# Patient Record
Sex: MALE | Race: WHITE | NOT HISPANIC OR LATINO | Employment: OTHER | URBAN - NONMETROPOLITAN AREA
[De-identification: names, ages, dates, MRNs, and addresses within clinical notes are randomized per-mention and may not be internally consistent; named-entity substitution may affect disease eponyms.]

---

## 2020-12-01 ENCOUNTER — HOSPITAL ENCOUNTER (EMERGENCY)
Facility: HOSPITAL | Age: 30
Discharge: HOME | End: 2020-12-01
Attending: EMERGENCY MEDICINE | Admitting: EMERGENCY MEDICINE
Payer: COMMERCIAL

## 2020-12-01 VITALS
BODY MASS INDEX: 30.93 KG/M2 | HEART RATE: 90 BPM | HEIGHT: 66 IN | TEMPERATURE: 98.9 F | WEIGHT: 192.46 LBS | SYSTOLIC BLOOD PRESSURE: 130 MMHG | DIASTOLIC BLOOD PRESSURE: 80 MMHG | OXYGEN SATURATION: 99 % | RESPIRATION RATE: 16 BRPM

## 2020-12-01 DIAGNOSIS — L05.01 PILONIDAL CYST WITH ABSCESS: Primary | ICD-10-CM

## 2020-12-01 PROCEDURE — 99283 EMERGENCY DEPT VISIT LOW MDM: CPT

## 2020-12-01 PROCEDURE — 10081 I&D PILONIDAL CYST COMP: CPT | Performed by: PHYSICIAN ASSISTANT

## 2020-12-01 PROCEDURE — 87205 SMEAR GRAM STAIN: CPT | Performed by: PHYSICIAN ASSISTANT

## 2020-12-01 PROCEDURE — 87070 CULTURE OTHR SPECIMN AEROBIC: CPT | Performed by: PHYSICIAN ASSISTANT

## 2020-12-01 PROCEDURE — 99285 EMERGENCY DEPT VISIT HI MDM: CPT | Performed by: PHYSICIAN ASSISTANT

## 2020-12-01 RX ORDER — DOXYCYCLINE HYCLATE 100 MG/1
100 CAPSULE ORAL ONCE
Status: COMPLETED | OUTPATIENT
Start: 2020-12-01 | End: 2020-12-01

## 2020-12-01 RX ORDER — HYDROCODONE BITARTRATE AND ACETAMINOPHEN 5; 325 MG/1; MG/1
1 TABLET ORAL ONCE
Status: COMPLETED | OUTPATIENT
Start: 2020-12-01 | End: 2020-12-01

## 2020-12-01 RX ORDER — HYDROCODONE BITARTRATE AND ACETAMINOPHEN 5; 325 MG/1; MG/1
1 TABLET ORAL EVERY 6 HOURS PRN
Qty: 4 TABLET | Refills: 0 | Status: SHIPPED | OUTPATIENT
Start: 2020-12-01 | End: 2020-12-16 | Stop reason: HOSPADM

## 2020-12-01 RX ORDER — LIDOCAINE HYDROCHLORIDE AND EPINEPHRINE 10; 10 MG/ML; UG/ML
5 INJECTION, SOLUTION INFILTRATION; PERINEURAL ONCE
Status: COMPLETED | OUTPATIENT
Start: 2020-12-01 | End: 2020-12-01

## 2020-12-01 RX ORDER — DOXYCYCLINE HYCLATE 100 MG/1
100 CAPSULE ORAL 2 TIMES DAILY
Qty: 14 CAPSULE | Refills: 0 | Status: SHIPPED | OUTPATIENT
Start: 2020-12-01 | End: 2020-12-08

## 2020-12-01 RX ADMIN — LIDOCAINE HYDROCHLORIDE,EPINEPHRINE BITARTRATE 5 ML: 10; .01 INJECTION, SOLUTION INFILTRATION; PERINEURAL at 13:56

## 2020-12-01 RX ADMIN — DOXYCYCLINE HYCLATE 100 MG: 100 CAPSULE ORAL at 13:55

## 2020-12-01 RX ADMIN — HYDROCODONE BITARTRATE AND ACETAMINOPHEN 1 TABLET: 5; 325 TABLET ORAL at 13:55

## 2020-12-03 LAB
BACTERIA WND AEROBE CULT: NORMAL
GRAM STN SPEC: NORMAL

## 2020-12-09 ENCOUNTER — CONSULT (OUTPATIENT)
Dept: SURGERY | Facility: CLINIC | Age: 30
End: 2020-12-09
Payer: COMMERCIAL

## 2020-12-09 VITALS
TEMPERATURE: 98.6 F | BODY MASS INDEX: 32.14 KG/M2 | WEIGHT: 200 LBS | HEART RATE: 94 BPM | HEIGHT: 66 IN | DIASTOLIC BLOOD PRESSURE: 84 MMHG | SYSTOLIC BLOOD PRESSURE: 131 MMHG

## 2020-12-09 DIAGNOSIS — L05.01 PILONIDAL CYST WITH ABSCESS: Primary | ICD-10-CM

## 2020-12-09 PROCEDURE — 99204 OFFICE O/P NEW MOD 45 MIN: CPT | Performed by: SURGERY

## 2020-12-09 RX ORDER — CHLORHEXIDINE GLUCONATE 4 G/100ML
SOLUTION TOPICAL DAILY PRN
Status: CANCELLED | OUTPATIENT
Start: 2020-12-09

## 2020-12-09 RX ORDER — SODIUM CHLORIDE, SODIUM LACTATE, POTASSIUM CHLORIDE, CALCIUM CHLORIDE 600; 310; 30; 20 MG/100ML; MG/100ML; MG/100ML; MG/100ML
125 INJECTION, SOLUTION INTRAVENOUS CONTINUOUS
Status: CANCELLED | OUTPATIENT
Start: 2020-12-16

## 2020-12-09 RX ORDER — HEPARIN SODIUM 5000 [USP'U]/ML
5000 INJECTION, SOLUTION INTRAVENOUS; SUBCUTANEOUS ONCE
Status: CANCELLED | OUTPATIENT
Start: 2020-12-16 | End: 2020-12-09

## 2020-12-09 RX ORDER — CEFAZOLIN SODIUM 2 G/50ML
2000 SOLUTION INTRAVENOUS ONCE
Status: CANCELLED | OUTPATIENT
Start: 2020-12-16 | End: 2020-12-09

## 2020-12-15 ENCOUNTER — ANESTHESIA EVENT (OUTPATIENT)
Dept: PERIOP | Facility: HOSPITAL | Age: 30
End: 2020-12-15
Payer: COMMERCIAL

## 2020-12-15 RX ORDER — OMEPRAZOLE 20 MG/1
20 CAPSULE, DELAYED RELEASE ORAL DAILY
Status: ON HOLD | COMMUNITY
End: 2021-06-02

## 2020-12-16 ENCOUNTER — ANESTHESIA (OUTPATIENT)
Dept: PERIOP | Facility: HOSPITAL | Age: 30
End: 2020-12-16
Payer: COMMERCIAL

## 2020-12-16 ENCOUNTER — HOSPITAL ENCOUNTER (OUTPATIENT)
Facility: HOSPITAL | Age: 30
Setting detail: OUTPATIENT SURGERY
Discharge: HOME | End: 2020-12-16
Attending: SURGERY | Admitting: SURGERY
Payer: COMMERCIAL

## 2020-12-16 VITALS
HEIGHT: 66 IN | TEMPERATURE: 98.5 F | HEART RATE: 78 BPM | OXYGEN SATURATION: 95 % | DIASTOLIC BLOOD PRESSURE: 70 MMHG | BODY MASS INDEX: 32.14 KG/M2 | WEIGHT: 200 LBS | RESPIRATION RATE: 18 BRPM | SYSTOLIC BLOOD PRESSURE: 118 MMHG

## 2020-12-16 VITALS — HEART RATE: 85 BPM

## 2020-12-16 DIAGNOSIS — L05.01 PILONIDAL CYST WITH ABSCESS: ICD-10-CM

## 2020-12-16 PROBLEM — K21.9 GASTROESOPHAGEAL REFLUX DISEASE: Status: ACTIVE | Noted: 2020-12-16

## 2020-12-16 PROBLEM — M54.9 BACK PAIN: Status: ACTIVE | Noted: 2020-12-16

## 2020-12-16 PROBLEM — J45.909 MILD ASTHMA: Status: ACTIVE | Noted: 2020-12-16

## 2020-12-16 PROBLEM — F17.200 SMOKING: Status: ACTIVE | Noted: 2020-12-16

## 2020-12-16 PROCEDURE — 11772 EXC PILONIDAL CYST COMP: CPT | Performed by: SURGERY

## 2020-12-16 PROCEDURE — 88304 TISSUE EXAM BY PATHOLOGIST: CPT | Performed by: PATHOLOGY

## 2020-12-16 RX ORDER — FENTANYL CITRATE 50 UG/ML
INJECTION, SOLUTION INTRAMUSCULAR; INTRAVENOUS AS NEEDED
Status: DISCONTINUED | OUTPATIENT
Start: 2020-12-16 | End: 2020-12-16

## 2020-12-16 RX ORDER — ONDANSETRON 2 MG/ML
INJECTION INTRAMUSCULAR; INTRAVENOUS AS NEEDED
Status: DISCONTINUED | OUTPATIENT
Start: 2020-12-16 | End: 2020-12-16

## 2020-12-16 RX ORDER — KETOROLAC TROMETHAMINE 30 MG/ML
INJECTION, SOLUTION INTRAMUSCULAR; INTRAVENOUS AS NEEDED
Status: DISCONTINUED | OUTPATIENT
Start: 2020-12-16 | End: 2020-12-16

## 2020-12-16 RX ORDER — FENTANYL CITRATE/PF 50 MCG/ML
50 SYRINGE (ML) INJECTION
Status: COMPLETED | OUTPATIENT
Start: 2020-12-16 | End: 2020-12-16

## 2020-12-16 RX ORDER — HYDROCODONE BITARTRATE AND ACETAMINOPHEN 5; 325 MG/1; MG/1
2 TABLET ORAL EVERY 6 HOURS PRN
Status: DISCONTINUED | OUTPATIENT
Start: 2020-12-16 | End: 2020-12-16 | Stop reason: HOSPADM

## 2020-12-16 RX ORDER — SODIUM CHLORIDE, SODIUM LACTATE, POTASSIUM CHLORIDE, CALCIUM CHLORIDE 600; 310; 30; 20 MG/100ML; MG/100ML; MG/100ML; MG/100ML
125 INJECTION, SOLUTION INTRAVENOUS CONTINUOUS
Status: DISCONTINUED | OUTPATIENT
Start: 2020-12-16 | End: 2020-12-16

## 2020-12-16 RX ORDER — HEPARIN SODIUM 5000 [USP'U]/ML
5000 INJECTION, SOLUTION INTRAVENOUS; SUBCUTANEOUS ONCE
Status: COMPLETED | OUTPATIENT
Start: 2020-12-16 | End: 2020-12-16

## 2020-12-16 RX ORDER — MEPERIDINE HYDROCHLORIDE 25 MG/ML
25 INJECTION INTRAMUSCULAR; INTRAVENOUS; SUBCUTANEOUS ONCE AS NEEDED
Status: DISCONTINUED | OUTPATIENT
Start: 2020-12-16 | End: 2020-12-16 | Stop reason: HOSPADM

## 2020-12-16 RX ORDER — MAGNESIUM HYDROXIDE 1200 MG/15ML
LIQUID ORAL AS NEEDED
Status: DISCONTINUED | OUTPATIENT
Start: 2020-12-16 | End: 2020-12-16 | Stop reason: HOSPADM

## 2020-12-16 RX ORDER — BUPIVACAINE HYDROCHLORIDE 5 MG/ML
INJECTION, SOLUTION PERINEURAL AS NEEDED
Status: DISCONTINUED | OUTPATIENT
Start: 2020-12-16 | End: 2020-12-16 | Stop reason: HOSPADM

## 2020-12-16 RX ORDER — HYDROMORPHONE HCL/PF 1 MG/ML
0.5 SYRINGE (ML) INJECTION
Status: DISCONTINUED | OUTPATIENT
Start: 2020-12-16 | End: 2020-12-16 | Stop reason: HOSPADM

## 2020-12-16 RX ORDER — CEFAZOLIN SODIUM 2 G/50ML
2000 SOLUTION INTRAVENOUS ONCE
Status: COMPLETED | OUTPATIENT
Start: 2020-12-16 | End: 2020-12-16

## 2020-12-16 RX ORDER — PROPOFOL 10 MG/ML
INJECTION, EMULSION INTRAVENOUS AS NEEDED
Status: DISCONTINUED | OUTPATIENT
Start: 2020-12-16 | End: 2020-12-16

## 2020-12-16 RX ORDER — SODIUM CHLORIDE, SODIUM LACTATE, POTASSIUM CHLORIDE, CALCIUM CHLORIDE 600; 310; 30; 20 MG/100ML; MG/100ML; MG/100ML; MG/100ML
125 INJECTION, SOLUTION INTRAVENOUS CONTINUOUS
Status: DISCONTINUED | OUTPATIENT
Start: 2020-12-16 | End: 2020-12-16 | Stop reason: HOSPADM

## 2020-12-16 RX ORDER — MIDAZOLAM HYDROCHLORIDE 2 MG/2ML
INJECTION, SOLUTION INTRAMUSCULAR; INTRAVENOUS AS NEEDED
Status: DISCONTINUED | OUTPATIENT
Start: 2020-12-16 | End: 2020-12-16

## 2020-12-16 RX ORDER — MORPHINE SULFATE 4 MG/ML
2 INJECTION, SOLUTION INTRAMUSCULAR; INTRAVENOUS EVERY 4 HOURS PRN
Status: DISCONTINUED | OUTPATIENT
Start: 2020-12-16 | End: 2020-12-16 | Stop reason: HOSPADM

## 2020-12-16 RX ORDER — LIDOCAINE HYDROCHLORIDE 10 MG/ML
0.5 INJECTION, SOLUTION EPIDURAL; INFILTRATION; INTRACAUDAL; PERINEURAL ONCE AS NEEDED
Status: DISCONTINUED | OUTPATIENT
Start: 2020-12-16 | End: 2020-12-16 | Stop reason: HOSPADM

## 2020-12-16 RX ORDER — HYDROCODONE BITARTRATE AND ACETAMINOPHEN 5; 325 MG/1; MG/1
1 TABLET ORAL EVERY 4 HOURS PRN
Qty: 20 TABLET | Refills: 0 | Status: SHIPPED | OUTPATIENT
Start: 2020-12-16 | End: 2020-12-21 | Stop reason: SDUPTHER

## 2020-12-16 RX ORDER — ROCURONIUM BROMIDE 10 MG/ML
INJECTION, SOLUTION INTRAVENOUS AS NEEDED
Status: DISCONTINUED | OUTPATIENT
Start: 2020-12-16 | End: 2020-12-16

## 2020-12-16 RX ORDER — ONDANSETRON 2 MG/ML
4 INJECTION INTRAMUSCULAR; INTRAVENOUS EVERY 8 HOURS PRN
Status: DISCONTINUED | OUTPATIENT
Start: 2020-12-16 | End: 2020-12-16 | Stop reason: HOSPADM

## 2020-12-16 RX ORDER — ALBUTEROL SULFATE 2.5 MG/3ML
2.5 SOLUTION RESPIRATORY (INHALATION) ONCE
Status: COMPLETED | OUTPATIENT
Start: 2020-12-16 | End: 2020-12-16

## 2020-12-16 RX ORDER — CHLORHEXIDINE GLUCONATE 4 G/100ML
SOLUTION TOPICAL DAILY PRN
Status: DISCONTINUED | OUTPATIENT
Start: 2020-12-16 | End: 2020-12-16 | Stop reason: HOSPADM

## 2020-12-16 RX ADMIN — FENTANYL CITRATE 50 MCG: 50 INJECTION, SOLUTION INTRAMUSCULAR; INTRAVENOUS at 09:05

## 2020-12-16 RX ADMIN — FENTANYL CITRATE 50 MCG: 0.05 INJECTION, SOLUTION INTRAMUSCULAR; INTRAVENOUS at 10:19

## 2020-12-16 RX ADMIN — HYDROCODONE BITARTRATE AND ACETAMINOPHEN 2 TABLET: 5; 325 TABLET ORAL at 11:03

## 2020-12-16 RX ADMIN — CEFAZOLIN SODIUM 2000 MG: 2 SOLUTION INTRAVENOUS at 09:06

## 2020-12-16 RX ADMIN — ONDANSETRON HYDROCHLORIDE 4 MG: 2 INJECTION, SOLUTION INTRAVENOUS at 09:24

## 2020-12-16 RX ADMIN — HEPARIN SODIUM 5000 UNITS: 5000 INJECTION INTRAVENOUS; SUBCUTANEOUS at 08:26

## 2020-12-16 RX ADMIN — HYDROMORPHONE HYDROCHLORIDE 0.5 MG: 1 INJECTION, SOLUTION INTRAMUSCULAR; INTRAVENOUS; SUBCUTANEOUS at 10:31

## 2020-12-16 RX ADMIN — SUGAMMADEX 181 MG: 100 INJECTION, SOLUTION INTRAVENOUS at 09:47

## 2020-12-16 RX ADMIN — FENTANYL CITRATE 50 MCG: 50 INJECTION, SOLUTION INTRAMUSCULAR; INTRAVENOUS at 09:56

## 2020-12-16 RX ADMIN — FENTANYL CITRATE 100 MCG: 50 INJECTION, SOLUTION INTRAMUSCULAR; INTRAVENOUS at 09:16

## 2020-12-16 RX ADMIN — SODIUM CHLORIDE, SODIUM LACTATE, POTASSIUM CHLORIDE, AND CALCIUM CHLORIDE 125 ML/HR: .6; .31; .03; .02 INJECTION, SOLUTION INTRAVENOUS at 08:25

## 2020-12-16 RX ADMIN — KETOROLAC TROMETHAMINE 30 MG: 30 INJECTION, SOLUTION INTRAMUSCULAR; INTRAVENOUS at 09:45

## 2020-12-16 RX ADMIN — ROCURONIUM BROMIDE 35 MG: 50 INJECTION, SOLUTION INTRAVENOUS at 09:06

## 2020-12-16 RX ADMIN — FENTANYL CITRATE 50 MCG: 0.05 INJECTION, SOLUTION INTRAMUSCULAR; INTRAVENOUS at 10:16

## 2020-12-16 RX ADMIN — MIDAZOLAM HYDROCHLORIDE 2 MG: 1 INJECTION, SOLUTION INTRAMUSCULAR; INTRAVENOUS at 09:02

## 2020-12-16 RX ADMIN — ALBUTEROL SULFATE 2.5 MG: 2.5 SOLUTION RESPIRATORY (INHALATION) at 10:07

## 2020-12-16 RX ADMIN — PROPOFOL 200 MG: 10 INJECTION, EMULSION INTRAVENOUS at 09:05

## 2020-12-21 ENCOUNTER — TELEPHONE (OUTPATIENT)
Dept: SURGERY | Facility: CLINIC | Age: 30
End: 2020-12-21

## 2020-12-21 DIAGNOSIS — L05.01 PILONIDAL CYST WITH ABSCESS: ICD-10-CM

## 2020-12-21 RX ORDER — HYDROCODONE BITARTRATE AND ACETAMINOPHEN 5; 325 MG/1; MG/1
1 TABLET ORAL EVERY 4 HOURS PRN
Qty: 20 TABLET | Refills: 0 | Status: SHIPPED | OUTPATIENT
Start: 2020-12-21 | End: 2021-03-31

## 2020-12-23 ENCOUNTER — OFFICE VISIT (OUTPATIENT)
Dept: SURGERY | Facility: CLINIC | Age: 30
End: 2020-12-23

## 2020-12-23 VITALS
HEIGHT: 66 IN | SYSTOLIC BLOOD PRESSURE: 150 MMHG | DIASTOLIC BLOOD PRESSURE: 100 MMHG | BODY MASS INDEX: 32.3 KG/M2 | WEIGHT: 201 LBS | TEMPERATURE: 98.2 F | HEART RATE: 98 BPM

## 2020-12-23 DIAGNOSIS — L05.01 PILONIDAL CYST WITH ABSCESS: Primary | ICD-10-CM

## 2020-12-23 PROCEDURE — 99024 POSTOP FOLLOW-UP VISIT: CPT | Performed by: SURGERY

## 2020-12-23 RX ORDER — IBUPROFEN 800 MG/1
800 TABLET ORAL EVERY 6 HOURS PRN
Qty: 30 TABLET | Refills: 2 | Status: SHIPPED | OUTPATIENT
Start: 2020-12-23 | End: 2021-05-02

## 2020-12-23 RX ORDER — GABAPENTIN 100 MG/1
100 CAPSULE ORAL 3 TIMES DAILY
Qty: 42 CAPSULE | Refills: 1 | Status: SHIPPED | OUTPATIENT
Start: 2020-12-23 | End: 2021-03-31

## 2020-12-31 ENCOUNTER — OFFICE VISIT (OUTPATIENT)
Dept: SURGERY | Facility: CLINIC | Age: 30
End: 2020-12-31

## 2020-12-31 VITALS
DIASTOLIC BLOOD PRESSURE: 74 MMHG | HEART RATE: 78 BPM | SYSTOLIC BLOOD PRESSURE: 128 MMHG | TEMPERATURE: 98.6 F | HEIGHT: 66 IN | WEIGHT: 204 LBS | BODY MASS INDEX: 32.78 KG/M2

## 2020-12-31 DIAGNOSIS — L05.01 PILONIDAL CYST WITH ABSCESS: Primary | ICD-10-CM

## 2020-12-31 PROCEDURE — 99024 POSTOP FOLLOW-UP VISIT: CPT | Performed by: SURGERY

## 2021-03-03 ENCOUNTER — APPOINTMENT (EMERGENCY)
Dept: RADIOLOGY | Facility: HOSPITAL | Age: 31
End: 2021-03-03
Payer: COMMERCIAL

## 2021-03-03 ENCOUNTER — APPOINTMENT (EMERGENCY)
Dept: CT IMAGING | Facility: HOSPITAL | Age: 31
End: 2021-03-03
Payer: COMMERCIAL

## 2021-03-03 ENCOUNTER — HOSPITAL ENCOUNTER (EMERGENCY)
Facility: HOSPITAL | Age: 31
Discharge: HOME | End: 2021-03-03
Attending: EMERGENCY MEDICINE | Admitting: EMERGENCY MEDICINE
Payer: COMMERCIAL

## 2021-03-03 VITALS
HEART RATE: 83 BPM | RESPIRATION RATE: 16 BRPM | BODY MASS INDEX: 30.12 KG/M2 | WEIGHT: 187.39 LBS | DIASTOLIC BLOOD PRESSURE: 64 MMHG | TEMPERATURE: 97.2 F | OXYGEN SATURATION: 99 % | HEIGHT: 66 IN | SYSTOLIC BLOOD PRESSURE: 130 MMHG

## 2021-03-03 DIAGNOSIS — M54.12 ACUTE CERVICAL RADICULOPATHY: Primary | ICD-10-CM

## 2021-03-03 PROCEDURE — G1004 CDSM NDSC: HCPCS

## 2021-03-03 PROCEDURE — 73030 X-RAY EXAM OF SHOULDER: CPT

## 2021-03-03 PROCEDURE — 72125 CT NECK SPINE W/O DYE: CPT

## 2021-03-03 PROCEDURE — 99284 EMERGENCY DEPT VISIT MOD MDM: CPT

## 2021-03-03 PROCEDURE — 99285 EMERGENCY DEPT VISIT HI MDM: CPT | Performed by: EMERGENCY MEDICINE

## 2021-03-03 RX ORDER — MORPHINE SULFATE 15 MG/1
7.5 TABLET ORAL EVERY 6 HOURS PRN
Qty: 7 TABLET | Refills: 0 | Status: SHIPPED | OUTPATIENT
Start: 2021-03-03 | End: 2021-03-31

## 2021-03-03 RX ORDER — HYDROCODONE BITARTRATE AND ACETAMINOPHEN 5; 325 MG/1; MG/1
1 TABLET ORAL ONCE
Status: COMPLETED | OUTPATIENT
Start: 2021-03-03 | End: 2021-03-03

## 2021-03-03 RX ADMIN — HYDROCODONE BITARTRATE AND ACETAMINOPHEN 1 TABLET: 5; 325 TABLET ORAL at 14:25

## 2021-03-03 NOTE — ED PROVIDER NOTES
History  Chief Complaint   Patient presents with    Arm Pain     pt complains of arm pain for 3 weeks  This is a 41-year-old right-handed man who presents to the emergency department stating that he is having pain about the left shoulder and left arm present for approximately the past three weeks  Pain is present at rest but worse with active range of motion in most planes of motion  Patient states that he woke up with his neck flexed and rotated to the left side approximately three weeks ago; he required the assistance of his significant other to move his neck back into its normal position  Since that time, he has had pain in the left shoulder with paresthesias radiating from the left side of his neck into the left upper extremity  He states that the left third digit is affected primarily with the paresthesias  More recently, he has noted some sensation of weakness in the left upper extremity  He notes that the symptoms may be worsening over time related to difficulties with positioning  He underwent pilonidal cyst excision in December 2020  This has limited his ability to lie supine when sleeping; he has therefore spent most of this time lying on either his right or left side (primary his right due to the pain in his left shoulder) when sleeping for the past 2 months  He has applied a topical analgesic medication and taken multiple doses of ibuprofen without any significant improvement  He has had prior left shoulder anterior dislocation  No surgery in the left upper extremity  A/p:  Left upper extremity paresthesias/weakness following what may be torticollis by description  Will obtain CT cervical spine and left shoulder x-ray  History provided by:  Patient and medical records  Arm Pain  Associated symptoms: myalgias    Associated symptoms: no chest pain and no rash        Prior to Admission Medications   Prescriptions Last Dose Informant Patient Reported? Taking? HYDROcodone-acetaminophen (NORCO) 5-325 mg per tablet   No No   Sig: Take 1 tablet by mouth every 4 (four) hours as needed for painMax Daily Amount: 6 tablets   gabapentin (NEURONTIN) 100 mg capsule   No No   Sig: Take 1 capsule (100 mg total) by mouth 3 (three) times a day   ibuprofen (MOTRIN) 800 mg tablet   No No   Sig: Take 1 tablet (800 mg total) by mouth every 6 (six) hours as needed for mild pain   omeprazole (PriLOSEC) 20 mg delayed release capsule   Yes No   Sig: Take 20 mg by mouth daily      Facility-Administered Medications: None       Past Medical History:   Diagnosis Date    Anesthesia     Pt reports " freaking out" when he was waking up- states he was pulling out IV   Anxiety     Asthma     Body piercing     Pt reports having an eyebrow piercing that cannot be removed   Broken teeth     Bronchitis     Constipation     Cough     Pt reports coughing "a lot"  He states he is a heavy smoker but also reports asthma  Pt does not have an inhaler  States he cannot get in to see a PCP because many are not taking new patients during Covid   Depression     GERD (gastroesophageal reflux disease)     Heart murmur     History of stomach ulcers     Insomnia     Pt state he "tosses and turns" all night long d/t pain   Kidney stone     Pt reports that stones passed on their own   Lumbar back pain with radiculopathy affecting left lower extremity     Pt reports falling off a roof and " breaking my back"    Pilonidal cyst     Pt reports recurring cyst that drains at times    Pneumonia     Shortness of breath     Teeth missing        Past Surgical History:   Procedure Laterality Date    MANDIBLE SURGERY      [place] REMV PILONIDAL LESION EXTENS N/A 12/16/2020    Procedure: EXCISION PILONIDAL CYST;  Surgeon: Faina Mckinney DO;  Location: [facility];  Service: General       History reviewed  No pertinent family history    I have reviewed and agree with the history as documented  E-Cigarette/Vaping    E-Cigarette Use Never User      E-Cigarette/Vaping Substances    Nicotine No     THC No     CBD No     Flavoring No     Other No     Unknown No      Social History     Tobacco Use    Smoking status: Current Every Day Smoker     Packs/day: 1 00     Years: 10 00     Pack years: 10 00     Types: Cigarettes    Smokeless tobacco: Former User    Tobacco comment: Pt quit smokeless tobacco ten years ago   Substance Use Topics    Alcohol use: Not Currently    Drug use: Yes     Frequency: 7 0 times per week     Types: Marijuana     Comment: Pt smokes about a 1/4 pound a week  Wants to get a Medical  marijuana card       Review of Systems   Constitutional: Negative  Cardiovascular: Negative for chest pain and palpitations  Musculoskeletal: Positive for arthralgias and myalgias  Skin: Negative for color change and rash  Neurological: Negative for weakness and numbness  All other systems reviewed and are negative  Physical Exam  Physical Exam  Vitals signs and nursing note reviewed  Constitutional:       General: He is awake  He is not in acute distress  Appearance: He is well-developed  HENT:      Head: Normocephalic and atraumatic  Right Ear: Hearing and external ear normal       Left Ear: Hearing and external ear normal    Neck:      Musculoskeletal: Muscular tenderness present  No spinous process tenderness  Thyroid: No thyroid mass or thyromegaly  Trachea: Trachea and phonation normal         Comments: There is no posterior midline tenderness to palpation or step-off  There is left-sided muscular hypertonicity particularly in the superior medial aspect of the left trapezius at approximately C6-7 level  Patient states that palpation of this area reproduces paresthesias in the LUE to the level of the elbow  Cardiovascular:      Rate and Rhythm: Normal rate and regular rhythm        Pulses:           Radial pulses are 2+ on the right side and 2+ on the left side  Heart sounds: Normal heart sounds, S1 normal and S2 normal  No murmur  No friction rub  No gallop  Pulmonary:      Effort: Pulmonary effort is normal  No respiratory distress  Breath sounds: Normal breath sounds  No stridor  No decreased breath sounds, wheezing, rhonchi or rales  Musculoskeletal:         General: No tenderness or deformity  Comments: There is no posterior midline C/T/L-spine tenderness to palpation or step-off  Left upper extremity:   There is moderate tenderness to palpation of mild hypertonicity of the glenoid and the medial scapula  Passive range of motion is intact; it is painful with extreme abduction and flexion but otherwise nonpainful  There is a positive impingement sign  Empty can test is negative  Skin:     General: Skin is warm and dry  Neurological:      Mental Status: He is alert and oriented to person, place, and time  GCS: GCS eye subscore is 4  GCS verbal subscore is 5  GCS motor subscore is 6  Motor: No abnormal muscle tone  Deep Tendon Reflexes:      Reflex Scores:       Brachioradialis reflexes are 2+ on the right side and 2+ on the left side  Patellar reflexes are 2+ on the right side and 2+ on the left side  Achilles reflexes are 2+ on the right side and 2+ on the left side  Comments: Strength 5/5 in UE bilaterally at shoulder/elbow/wrist in all planes of motion    There is mild hyperesthesia to sharp/dull sensation in the C6-8 distribution in the LUE   Otherwise intact sensation to sharp/dull in the b/l UE         Vital Signs  ED Triage Vitals [03/03/21 1403]   Temperature Pulse Respirations Blood Pressure SpO2   (!) 97 2 °F (36 2 °C) 83 16 130/64 99 %      Temp Source Heart Rate Source Patient Position - Orthostatic VS BP Location FiO2 (%)   Temporal -- Lying Right arm --      Pain Score       8           Vitals:    03/03/21 1403   BP: 130/64   Pulse: 83   Patient Position - Orthostatic VS: Lying         Visual Acuity      ED Medications  Medications   HYDROcodone-acetaminophen (NORCO) 5-325 mg per tablet 1 tablet (1 tablet Oral Given 3/3/21 1425)       Diagnostic Studies  Results Reviewed     None                 XR shoulder 2+ views LEFT   ED Interpretation by Leela Plata DO (03/03 1505)   No evidence of fracture/dislocation  Joint spaces appear normal       Final Result by Kelly George MD (03/03 1541)      No acute osseous abnormality  Workstation performed: [id number]         CT spine cervical without contrast   Final Result by Yang Servin DO (03/03 1526)      No acute findings  Mild degenerative changes as above  No discernible compressive neuropathy  Consider nonemergent follow-up cervical MRI for any persistent or worsening symptoms  Incidental findings as above  Workstation performed: [id number]                    Procedures  Procedures         ED Course  ED Course as of Mar 03 1618   Wed Mar 03, 2021   1447 CT completed and awaiting interpretation      1527 IMPRESSION:     No acute findings      Mild degenerative changes as above  No discernible compressive neuropathy  Consider nonemergent follow-up cervical MRI for any persistent or worsening symptoms      Incidental findings as above  CT spine cervical without contrast       MDM  Number of Diagnoses or Management Options  Acute cervical radiculopathy: new and requires workup  Diagnosis management comments: All results were noted and discussed with the patient  Will give follow-up to comprehensive [address] for further evaluation  Patient states he will stop using ibuprofen as he is developing some degree of stomach upset and nausea; he does not want to continue with his medication  Both because of this and given the duration of his symptoms, I do not think that steroids would be particularly helpful for him  Activity as tolerated with his left upper extremity    All questions were answered prior to discharge  The patient expressed understanding and agreed to plan  Amount and/or Complexity of Data Reviewed  Tests in the radiology section of CPT®: ordered and reviewed  Decide to obtain previous medical records or to obtain history from someone other than the patient: yes  Review and summarize past medical records: yes  Independent visualization of images, tracings, or specimens: yes    Risk of Complications, Morbidity, and/or Mortality  Presenting problems: high  Diagnostic procedures: high  Management options: moderate  General comments: [place]MP queried prior to Rx  Based on review of records, there is no evidence of controlled substance abuse or repeated inappropriate ED visits to obtain controlled substances  It is therefore reasonable to prescribe a short course of opiate-based analgesic for symptom control with close f/u to PMD also advised  Precautions given to avoid use of opiate medications while driving and to abstain from alcohol while using  Patient Progress  Patient progress: stable      Disposition  Final diagnoses:   Acute cervical radiculopathy     Time reflects when diagnosis was documented in both MDM as applicable and the Disposition within this note     Time User Action Codes Description Comment    3/3/2021  3:41 PM Kateryna Solis Add [G24 76] Acute cervical radiculopathy       ED Disposition     ED Disposition Condition Date/Time Comment    Discharge Stable Wed Mar 3, 2021  3:40 PM Olga Chavez discharge to home/self care              Follow-up Information     Follow up With Specialties Details Why Contact Info Additional Information    [facility]  Call in 1 day For follow-up of your neck symptoms [address] [facility] [place] [address] [facility] [address], [address] [phone]     [facility] Emergency Department Emergency Medicine Go to  If symptoms worsen: if you develop any new numbness or weakness in any other limbs, if you are completely unable to move your neck, or if you develop a fever with your neck/shoulder pain [phone]  [facility] Emergency Department, [address], [address]          Discharge Medication List as of 3/3/2021  3:46 PM      START taking these medications    Details   morphine (MSIR) 15 mg tablet Take 0 5 tablets (7 5 mg total) by mouth every 6 (six) hours as needed for severe pain (Do not drive or drink alcohol while using)Max Daily Amount: 30 mg, Starting Wed 3/3/2021, Normal         CONTINUE these medications which have NOT CHANGED    Details   gabapentin (NEURONTIN) 100 mg capsule Take 1 capsule (100 mg total) by mouth 3 (three) times a day, Starting Wed 12/23/2020, Normal      HYDROcodone-acetaminophen (NORCO) 5-325 mg per tablet Take 1 tablet by mouth every 4 (four) hours as needed for painMax Daily Amount: 6 tablets, Starting Mon 12/21/2020, Normal      ibuprofen (MOTRIN) 800 mg tablet Take 1 tablet (800 mg total) by mouth every 6 (six) hours as needed for mild pain, Starting Wed 12/23/2020, Normal      omeprazole (PriLOSEC) 20 mg delayed release capsule Take 20 mg by mouth daily, Historical Med           No discharge procedures on file      PDMP Review       Value Time User    PDMP Reviewed  Yes 12/1/2020  1:45 PM Jass Baker PA-C          ED Provider  Electronically Signed by           Allison Bernal DO  03/03/21 3597

## 2021-03-25 ENCOUNTER — TELEPHONE (OUTPATIENT)
Dept: PAIN MEDICINE | Facility: CLINIC | Age: 31
End: 2021-03-25

## 2021-03-25 NOTE — TELEPHONE ENCOUNTER
Patient called cancelling today's consultation due to him not having a baby sittter  He's rescheduled for next week

## 2021-03-31 ENCOUNTER — CONSULT (OUTPATIENT)
Dept: PAIN MEDICINE | Facility: CLINIC | Age: 31
End: 2021-03-31
Payer: COMMERCIAL

## 2021-03-31 VITALS
HEIGHT: 66 IN | BODY MASS INDEX: 29.63 KG/M2 | DIASTOLIC BLOOD PRESSURE: 78 MMHG | SYSTOLIC BLOOD PRESSURE: 117 MMHG | WEIGHT: 184.4 LBS | HEART RATE: 76 BPM

## 2021-03-31 DIAGNOSIS — M54.12 CERVICAL RADICULOPATHY: ICD-10-CM

## 2021-03-31 DIAGNOSIS — M54.2 NECK PAIN: Primary | ICD-10-CM

## 2021-03-31 PROCEDURE — 99204 OFFICE O/P NEW MOD 45 MIN: CPT | Performed by: ANESTHESIOLOGY

## 2021-03-31 RX ORDER — AMITRIPTYLINE HYDROCHLORIDE 25 MG/1
25 TABLET, FILM COATED ORAL
Qty: 30 TABLET | Refills: 1 | Status: SHIPPED | OUTPATIENT
Start: 2021-03-31 | End: 2021-04-23

## 2021-03-31 NOTE — PATIENT INSTRUCTIONS
Neck Exercises   WHAT YOU NEED TO KNOW:   Why is it important to do neck exercises? Neck exercises help reduce neck pain, and improve neck movement and strength  Neck exercises also help prevent long-term neck problems  What do I need to know about neck exercises? · Do the exercises every day,  or as often as directed by your healthcare provider  · Move slowly, gently, and smoothly  Avoid fast or jerky motions  · Stand and sit the way your healthcare provider shows you  Good posture may reduce your neck pain  Check your posture often, even when you are not doing your neck exercises  How do I perform neck exercises safely? · Exercise position:  You may sit or stand while you do neck exercises  Face forward  Your shoulders should be straight and relaxed, with a good posture  · Head tilts, forward and back:  Gently bow your head and try to touch your chin to your chest  Your healthcare provider may tell you to push on the back of your neck to help bow your head  Raise your chin back to the starting position  Tilt your head back as far as possible so you are looking up at the ceiling  Your healthcare provider may tell you to lift your chin to help tilt your head back  Return your head to the starting position  · Head tilts, side to side:  Tilt your head, bringing your ear toward your shoulder  Then tilt your head toward the other shoulder  · Head turns:  Turn your head to look over your shoulder  Tilt your chin down and try to touch it to your shoulder  Do not raise your shoulder to your chin  Face forward again  Do the same on the other side  · Head rolls:  Slowly bring your chin toward your chest  Next, roll your head to the right  Your ear should be positioned over your shoulder  Hold this position for 5 seconds  Roll your head back toward your chest and to the left into the same position  Hold for 5 seconds   Gently roll your head back and around in a clockwise [place] 3 times  Next, move your head in the reverse direction (counterclockwise) in a [place] 3 times  Do not shrug your shoulders upwards while you do this exercise  When should I contact my healthcare provider? · Your pain does not get better, or gets worse  · You have questions or concerns about your condition, care, or exercise program     CARE AGREEMENT:   You have the right to help plan your care  Learn about your health condition and how it may be treated  Discuss treatment options with your healthcare providers to decide what care you want to receive  You always have the right to refuse treatment  The above information is an  only  It is not intended as medical advice for individual conditions or treatments  Talk to your doctor, nurse or pharmacist before following any medical regimen to see if it is safe and effective for you  © Copyright [address] Information is for End User's use only and may not be sold, redistributed or otherwise used for commercial purposes   All illustrations and images included in CareNotes® are the copyrighted property of A D [employer] M [employer]  or [address] [place] [address]
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
## 2021-04-08 ENCOUNTER — EVALUATION (OUTPATIENT)
Dept: PHYSICAL THERAPY | Facility: HOME HEALTHCARE | Age: 31
End: 2021-04-08
Payer: COMMERCIAL

## 2021-04-08 DIAGNOSIS — M54.12 CERVICAL RADICULOPATHY: ICD-10-CM

## 2021-04-08 DIAGNOSIS — M54.2 NECK PAIN: ICD-10-CM

## 2021-04-08 PROCEDURE — 97162 PT EVAL MOD COMPLEX 30 MIN: CPT | Performed by: PHYSICAL THERAPIST

## 2021-04-08 PROCEDURE — 97140 MANUAL THERAPY 1/> REGIONS: CPT | Performed by: PHYSICAL THERAPIST

## 2021-04-08 NOTE — PROGRESS NOTES
PT Evaluation     Today's date: 2021  Patient name: Zen Horton  : 1990  MRN: [id number]  Referring provider: Jenny Goodman MD  Dx:   Encounter Diagnosis     ICD-10-CM    1  Neck pain  M54 2 Ambulatory referral to Physical Therapy   2  Cervical radiculopathy  M54 12 Ambulatory referral to Physical Therapy                  Assessment  Assessment details: Pt Zen Horton is a 27 y o  who presents to OPPT with s/s consistent with cervical radiculopathy  PT notes moderate limitations with cervical mobility but no significant pain  He has more noted pain with L shoulder mobility, with joint laxity and anterior apprehension observed  He has mild limitations with L UE ROM and strength 2* to pain  Pt states that he can't lift/carry, reaching behind his back, reach overhead, or sleep comfortably due to increase N/T in the L hand  Pt would benefit from skilled therapy services to address outlined impairments, work towards goals, and restore pts PLOF  Thank you! Impairments: abnormal or restricted ROM, abnormal movement, activity intolerance, impaired physical strength, lacks appropriate home exercise program, pain with function and poor posture   Understanding of Dx/Px/POC: fair   Prognosis: fair    Goals  STGs to be achieved in 4 weeks:  -Pt to demonstrate reduced subjective pain rating "at worst" by at least 2-3 points from Initial Eval to allow for reduced pain with ADLs and improved functional activity tolerance    -Pt to demonstrate C/S ROM with minimal restrictions in order to maximize joint mobility and function and allow for progression of exercise program and achievement of goals    -Pt to demonstrate increased MMT of LUE by at least 1/2 grade in order to improve safety and stability with ADLs and functional mobility       LTGs to be achieved upon discharge:   -Pt will be I with HEP in order to continue to improve quality of life and independence and reduce risk for re-injury    -Pt to demonstrate return to activities of daily living without limiations or restrictions    -Pt will return to lifting > 10# with L UE to help facilitate return to household activities independently   -Pt to demonstrate return to work activities without limitations or restrictions    -Pt to demonstrate improved function as noted by achieving or exceeding predicted score on FOTO outcomes assessment tool  Plan  Patient would benefit from: skilled physical therapy  Planned modality interventions: thermotherapy: hydrocollator packs and cryotherapy  Planned therapy interventions: manual therapy, neuromuscular re-education, postural training, patient education, therapeutic exercise, therapeutic activities, home exercise program, functional ROM exercises and flexibility  Frequency: 2x week  Duration in weeks: 4  Plan of Care beginning date: 4/8/2021  Plan of Care expiration date: 5/6/2021  Treatment plan discussed with: patient        Subjective Evaluation    History of Present Illness  Mechanism of injury: Pt notes that he began to have pain in L shoulder about 2 months ago  He went to the emergency department on 3/3/21 with pain in the left shoulder and left arm  Pain is present at rest but worse with active range of motion in most planes of motion  Patient states that he woke up with his neck flexed and rotated to the left side approximately 2 months ago; he required the assistance of his significant other to move his neck back into its normal position  Since that time, he has had pain in the left shoulder with paresthesias radiating from the left side of his neck into the left upper extremity  More recently, he has noted some sensation of weakness in the left upper extremity  He was referred from ED to follow up with pain management  Dr Tanesha Hernadez ordered EMG and MRI; pending insurance approval  Referral to OPPT for conservative management of s s  Return to [place] in 5 weeks     Quality of life: good    Pain  At best pain ratin  At worst pain rating: 10  Quality: dull ache, needle-like, radiating and sharp  Progression: worsening      Diagnostic Tests  X-ray: normal  Treatments  Current treatment: physical therapy  Patient Goals  Patient goals for therapy: increased motion, decreased pain, increased strength and independence with ADLs/IADLs          Objective     Postural Observations  Seated posture: poor  Standing posture: fair  Correction of posture: makes symptoms worse    Additional Postural Observation Details  Significant bilateral rounded shoulders     Palpation   Left   Hypertonic in the upper trapezius  Tenderness of the middle trapezius and upper trapezius  Neurological Testing     Sensation     Shoulder   Left Shoulder   Intact: light touch  Paresthesia: light touch    Right Shoulder   Intact: light touch    Active Range of Motion   Cervical/Thoracic Spine       Cervical    Flexion:  Restriction level: minimal  Extension:  Restriction level: minimal  Left lateral flexion:  Restriction level: moderate  Right lateral flexion:  Restriction level moderate  Left rotation:  Restriction level: moderate  Right rotation:  Restriction level: moderate  Left Shoulder   Flexion: 160 degrees with pain  Abduction: 160 degrees with pain  External rotation 90°: 50 degrees with pain  Internal rotation 90°: 25 degrees with pain    Right Shoulder   Normal active range of motion    Left Elbow   Normal active range of motion  Elbow hyperextension    Right Elbow   Normal active range of motion  Elbow hyperextension    Strength/Myotome Testing     Left Shoulder     Planes of Motion   Flexion: 4-   Abduction: 4-   External rotation at 90°: 3+   Internal rotation at 90°: 3-     Right Shoulder   Normal muscle strength    Left Elbow   Normal strength    Right Elbow   Normal strength    Tests     Left Shoulder   Positive apprehension, laxity (step off), painful arc and sulcus sign                  Re-eval Date:     Date        Visit Count 1       FOTO Completed             Precautions: none        Manuals 4/8                                       Neuro Re-Ed         MTP/LTP        [place] tband ER         Ball on wall                                         Ther Ex        UBE - alt         Scapular retractions         Shrugs        Doorway stretch        Standing FF/Abd to 90*         C/S isometrics         C/S retractions         SA punches         S/L Abd/ER        Prone flex/HA/ext                                        Ther Activity                        Gait Training                        Modalities

## 2021-04-14 ENCOUNTER — OFFICE VISIT (OUTPATIENT)
Dept: PHYSICAL THERAPY | Facility: HOME HEALTHCARE | Age: 31
End: 2021-04-14
Payer: COMMERCIAL

## 2021-04-14 DIAGNOSIS — M54.2 NECK PAIN: Primary | ICD-10-CM

## 2021-04-14 PROCEDURE — 97110 THERAPEUTIC EXERCISES: CPT

## 2021-04-14 PROCEDURE — 97112 NEUROMUSCULAR REEDUCATION: CPT

## 2021-04-14 NOTE — PROGRESS NOTES
Daily Note     Today's date: 2021  Patient name: Minerva Lucia  : 1990  MRN: [id number]  Referring provider: Tavia Delgado MD  Dx:   Encounter Diagnosis     ICD-10-CM    1  Neck pain  M54 2        Start Time: 1300          Subjective: I took a pain pill this morning  A lot of tightness, burning sensation at UT and N/T into L hand and fingers  I think I may try a Chiropractor  Objective: See treatment diary below    Assessment: Pt adilson initiation of TE as per flow sheet well  Intermittent n/t of L hand which required rest periods  Pt only able to complete 4' on UBE 2* pain  Pt reports same pain at end of Tx  Advised pt to continue with self stretch and use CP as needed  Patient would benefit from continued PT    Plan: Continue per plan of care        Re-eval Date:     Date 21      Visit Count 1 2      FOTO Completed             Precautions: none        Manuals 21                      Neuro Re-Ed   21      MTP/LTP  Red 1 x 10 ea      [place] tband ER   Red 1 x 10 ea      Ball on wall   1 x 10 ea               Ther Ex        UBE - alt   4' alt       Scapular retractions   1 x 10      Shrugs  1 x 10      Doorway stretch  20" x 3       UT/Lev stretch  20" x 2      Standing FF/Abd to 90*         C/S isometrics   5" x 5 ea      C/S retractions         SA punches         S/L Abd/ER        Prone flex/HA/ext                                        Ther Activity                        Gait Training                        Modalities

## 2021-04-16 ENCOUNTER — OFFICE VISIT (OUTPATIENT)
Dept: PHYSICAL THERAPY | Facility: HOME HEALTHCARE | Age: 31
End: 2021-04-16
Payer: COMMERCIAL

## 2021-04-16 DIAGNOSIS — M54.2 NECK PAIN: Primary | ICD-10-CM

## 2021-04-16 PROCEDURE — 97112 NEUROMUSCULAR REEDUCATION: CPT

## 2021-04-16 PROCEDURE — 97110 THERAPEUTIC EXERCISES: CPT

## 2021-04-16 NOTE — PROGRESS NOTES
Daily Note     Today's date: 2021  Patient name: Minerva Holt  : 1990  MRN: [id number]  Referring provider: Rolando Gerber MD  Dx: No diagnosis found  Start Time: 105          Subjective: Most of my pain is at my L UT and into the sh blade  I have constant n/t in my L middle finger  Objective: See treatment diary below    Assessment: Tolerated treatment well  Pt with report of increased pain during UBE retro movement and with wall slide scaption  Added c/s retractions without incident  Pt is able to complete TE with minimal rests  Pt to use CP at home as needed  Patient would benefit from continued PT    Plan: Continue per plan of care  Possible addition of C/S MT next visit        Re-eval Date:     Date 21     Visit Count 1 2 3     FOTO Completed             Precautions: none        Manuals 21                     Neuro Re-Ed   21     MTP/LTP  Red 1 x 10 ea Red 1 x 10 ea     [place] tband ER   Red 1 x 10 ea Red 1 x 10 ea     Ball on wall   1 x 10 ea  1 x 10             Ther Ex        UBE - alt   4' alt  5' alt     Scapular retractions   1 x 10 1 x 10     Shrugs  1 x 10 1 x 20     Wall slide   Flex/scap   1 x 10 ea     Doorway stretch  20" x 3  20" x 3     UT/Lev stretch  20" x 2 20" x 2     Standing FF/Abd to 90*         C/S isometrics   5" x 5 ea 5" x 5 ea     C/S retractions    5" x 10     SA punches         S/L Abd/ER        Prone flex/HA/ext                                        Ther Activity                        Gait Training                        Modalities

## 2021-04-19 ENCOUNTER — TELEPHONE (OUTPATIENT)
Dept: PAIN MEDICINE | Facility: CLINIC | Age: 31
End: 2021-04-19

## 2021-04-21 ENCOUNTER — HOSPITAL ENCOUNTER (EMERGENCY)
Facility: HOSPITAL | Age: 31
Discharge: HOME | End: 2021-04-21
Attending: EMERGENCY MEDICINE | Admitting: EMERGENCY MEDICINE
Payer: COMMERCIAL

## 2021-04-21 ENCOUNTER — APPOINTMENT (OUTPATIENT)
Dept: PHYSICAL THERAPY | Facility: HOME HEALTHCARE | Age: 31
End: 2021-04-21
Payer: COMMERCIAL

## 2021-04-21 VITALS
OXYGEN SATURATION: 97 % | BODY MASS INDEX: 29.05 KG/M2 | DIASTOLIC BLOOD PRESSURE: 54 MMHG | RESPIRATION RATE: 15 BRPM | TEMPERATURE: 97.1 F | WEIGHT: 180 LBS | SYSTOLIC BLOOD PRESSURE: 108 MMHG | HEART RATE: 68 BPM

## 2021-04-21 DIAGNOSIS — R20.2 PARESTHESIAS IN LEFT HAND: ICD-10-CM

## 2021-04-21 DIAGNOSIS — M54.12 CERVICAL RADICULOPATHY: Primary | ICD-10-CM

## 2021-04-21 PROCEDURE — 99285 EMERGENCY DEPT VISIT HI MDM: CPT | Performed by: EMERGENCY MEDICINE

## 2021-04-21 PROCEDURE — 99283 EMERGENCY DEPT VISIT LOW MDM: CPT

## 2021-04-21 RX ORDER — MORPHINE SULFATE 15 MG/1
7.5 TABLET ORAL ONCE
Status: COMPLETED | OUTPATIENT
Start: 2021-04-21 | End: 2021-04-21

## 2021-04-21 RX ORDER — MORPHINE SULFATE 15 MG/1
7.5 TABLET ORAL EVERY 4 HOURS PRN
Qty: 9 TABLET | Refills: 0 | Status: SHIPPED | OUTPATIENT
Start: 2021-04-21 | End: 2021-04-24

## 2021-04-21 RX ORDER — GABAPENTIN 300 MG/1
300 CAPSULE ORAL 3 TIMES DAILY
Qty: 87 CAPSULE | Refills: 0 | Status: SHIPPED | OUTPATIENT
Start: 2021-04-21 | End: 2021-05-13

## 2021-04-21 RX ORDER — LIDOCAINE 50 MG/G
1 PATCH TOPICAL ONCE
Status: DISCONTINUED | OUTPATIENT
Start: 2021-04-21 | End: 2021-04-21 | Stop reason: HOSPADM

## 2021-04-21 RX ORDER — GABAPENTIN 300 MG/1
300 CAPSULE ORAL ONCE
Status: COMPLETED | OUTPATIENT
Start: 2021-04-21 | End: 2021-04-21

## 2021-04-21 RX ORDER — ACETAMINOPHEN 325 MG/1
975 TABLET ORAL ONCE
Status: COMPLETED | OUTPATIENT
Start: 2021-04-21 | End: 2021-04-21

## 2021-04-21 RX ADMIN — GABAPENTIN 300 MG: 300 CAPSULE ORAL at 13:04

## 2021-04-21 RX ADMIN — ACETAMINOPHEN 975 MG: 325 TABLET, FILM COATED ORAL at 12:17

## 2021-04-21 RX ADMIN — MORPHINE SULFATE 7.5 MG: 15 TABLET ORAL at 12:17

## 2021-04-21 NOTE — ED PROVIDER NOTES
History  Chief Complaint   Patient presents with    Shoulder Pain     complaining of pain from left shoulder to finger tips with numbness         39-year-old male who is presenting with left-sided neck pain  Patient was initially seen for this on March 3rd  At that visit, the patient underwent left shoulder x-ray which was normal   CT cervical spine demonstrated mild degenerative changes at C6-C7 and a mild disc protrusion at C3-C4  He was prescribed a 3 day course of MSIR and advised to follow-up with Pain Management  The patient did follow-up and was started on amitriptyline which unfortunately he did not tolerate secondary to feeling jittery    He is no longer taking this  Patient also has been following up with PT  Patient presents today due to worsening pain  He has had the pain consistently since it started in early January  Patient reports that he did fall down some stairs in early January and that the pain started after that  The pain is present every day but is sometimes better and sometimes worse  The pain is worse with movement of the neck and arm  Patient has not tried any medications for the pain today  He did try smoking marijuana but this did not relieve his pain  Patient reports the pain is located along the left side of the neck with radiation into the left shoulder and through the left upper arm into the left elbow  The patient reports numbness and tingling radiating down his left lower arm into his left hand  His left middle finger is consistently numb  He does sometimes have numbness of the thumb and index finger as well  Patient reports some weakness of the left hand  He does drop objects frequently  Patient is right-hand dominant  He denies any leg weakness, urinary incontinence, urinary retention, or saddle anesthesia  No other complaints on review of systems    Patient is trying to get an outpatient MRI done but unfortunately he is having difficulty with his insurance company  Prior to Admission Medications   Prescriptions Last Dose Informant Patient Reported? Taking?   amitriptyline (ELAVIL) 25 mg tablet   No No   Sig: Take 1 tablet (25 mg total) by mouth daily at bedtime   ibuprofen (MOTRIN) 800 mg tablet   No No   Sig: Take 1 tablet (800 mg total) by mouth every 6 (six) hours as needed for mild pain   omeprazole (PriLOSEC) 20 mg delayed release capsule   Yes No   Sig: Take 20 mg by mouth daily      Facility-Administered Medications: None       Past Medical History:   Diagnosis Date    Anesthesia     Pt reports " freaking out" when he was waking up- states he was pulling out IV   Anxiety     Asthma     Body piercing     Pt reports having an eyebrow piercing that cannot be removed   Broken teeth     Bronchitis     Constipation     Cough     Pt reports coughing "a lot"  He states he is a heavy smoker but also reports asthma  Pt does not have an inhaler  States he cannot get in to see a PCP because many are not taking new patients during Covid   Depression     GERD (gastroesophageal reflux disease)     Heart murmur     History of stomach ulcers     Insomnia     Pt state he "tosses and turns" all night long d/t pain   Kidney stone     Pt reports that stones passed on their own   Lumbar back pain with radiculopathy affecting left lower extremity     Pt reports falling off a roof and " breaking my back"    Pilonidal cyst     Pt reports recurring cyst that drains at times    Pneumonia     Shortness of breath     Teeth missing        Past Surgical History:   Procedure Laterality Date    MANDIBLE SURGERY      [place] REMV PILONIDAL LESION EXTENS N/A 12/16/2020    Procedure: EXCISION PILONIDAL CYST;  Surgeon: Lanie Johnson DO;  Location: [facility];  Service: General       History reviewed  No pertinent family history  I have reviewed and agree with the history as documented      E-Cigarette/Vaping    E-Cigarette Use Never User      E-Cigarette/Vaping Substances    Nicotine No     THC No     CBD No     Flavoring No     Other No     Unknown No      Social History     Tobacco Use    Smoking status: Current Every Day Smoker     Packs/day: 1 00     Years: 10 00     Pack years: 10 00     Types: Cigarettes    Smokeless tobacco: Former User    Tobacco comment: Pt quit smokeless tobacco ten years ago   Substance Use Topics    Alcohol use: Not Currently    Drug use: Yes     Frequency: 7 0 times per week     Types: Marijuana     Comment: Pt smokes about a 1/4 pound a week  Wants to get a Medical  marijuana card       Review of Systems   Constitutional: Negative for diaphoresis, fever and unexpected weight change  HENT: Negative for congestion, rhinorrhea and sore throat  Eyes: Negative for pain, discharge and visual disturbance  Respiratory: Negative for cough, shortness of breath and wheezing  Cardiovascular: Negative for chest pain, palpitations and leg swelling  Gastrointestinal: Negative for abdominal pain, blood in stool, constipation, diarrhea, nausea and vomiting  Genitourinary: Negative for dysuria, flank pain and hematuria  Musculoskeletal: Positive for arthralgias (left shoulder) and neck pain  Negative for myalgias  Skin: Negative for rash and wound  Allergic/Immunologic: Negative for environmental allergies and food allergies  Neurological: Negative for dizziness, seizures, weakness and numbness  Hematological: Negative for adenopathy  Psychiatric/Behavioral: Negative for confusion and hallucinations  Physical Exam  Physical Exam  Vitals signs and nursing note reviewed  Constitutional:       Appearance: He is well-developed  He is not diaphoretic  HENT:      Head: Normocephalic and atraumatic  Right Ear: External ear normal       Left Ear: External ear normal       Nose: Nose normal    Eyes:      Pupils: Pupils are equal, round, and reactive to light     Cardiovascular:      Rate and Rhythm: Normal rate and regular rhythm  Pulmonary:      Effort: Pulmonary effort is normal  No respiratory distress  Musculoskeletal: Normal range of motion  General: No deformity  Comments: Patient has 5/5 strength in the bilateral upper extremities with elbow flexion, wrist extension, elbow extension, and finger abduction  He has 2+ reflexes at the bilateral biceps, brachioradialis, and triceps  He has diminished sensation in the left middle finger  Sensation is otherwise normal   Patient has normal, symmetric strength in the bilateral legs  He has tenderness to palpation along the left cervical paraspinal muscles and into the left shoulder  Positive Spurling sign on the left  Skin:     General: Skin is warm and dry  Capillary Refill: Capillary refill takes less than 2 seconds  Neurological:      Mental Status: He is alert and oriented to person, place, and time  Comments: No gross motor deficits noted  Cranial nerves II-XII are intact  Speech is normal, without dysarthria or aphasia     Psychiatric:         Mood and Affect: Mood normal          Behavior: Behavior normal          Vital Signs  ED Triage Vitals [04/21/21 1048]   Temperature Pulse Respirations Blood Pressure SpO2   (!) 97 1 °F (36 2 °C) 102 16 135/63 98 %      Temp Source Heart Rate Source Patient Position - Orthostatic VS BP Location FiO2 (%)   Temporal Monitor Sitting Right arm --      Pain Score       8           Vitals:    04/21/21 1048 04/21/21 1130   BP: 135/63 108/54   Pulse: 102 68   Patient Position - Orthostatic VS: Sitting Sitting         Visual Acuity      ED Medications  Medications   lidocaine (LIDODERM) 5 % patch 1 patch (1 patch Topical Not Given 4/21/21 1219)   morphine (MSIR) IR tablet 7 5 mg (7 5 mg Oral Given 4/21/21 1217)   acetaminophen (TYLENOL) tablet 975 mg (975 mg Oral Given 4/21/21 1217)   gabapentin (NEURONTIN) capsule 300 mg (300 mg Oral Given 4/21/21 1304)       Diagnostic Studies  Results Reviewed     None No orders to display              Procedures  Procedures         ED Course                                           MDM  Number of Diagnoses or Management Options  Cervical radiculopathy: established and worsening  Paresthesias in left hand: established and worsening  Diagnosis management comments:     Clinical presentation consistent with worsening cervical radiculopathy  The patient was offered a burst of corticosteroids but declined due to GI intolerance  He also reported that he did not tolerate NSAIDs secondary to GI intolerance, specifically peptic ulcer disease  He had been prescribed amitriptyline by Pain Management but was not taking it  Physical examination did not demonstrate any motor weakness or findings suggestive of myelopathy or spinal cord compression  The patient was treated with MSIR, Tylenol, and gabapentin  Gabapentin was prescribed for cervical radiculopathy  He was advised to follow-up with Pain Management and PT  Return precautions were provided  Patient verbalized understanding         Amount and/or Complexity of Data Reviewed  Tests in the radiology section of CPT®: reviewed  Decide to obtain previous medical records or to obtain history from someone other than the patient: yes  Review and summarize past medical records: yes    Risk of Complications, Morbidity, and/or Mortality  Presenting problems: low  Diagnostic procedures: minimal  Management options: minimal    Patient Progress  Patient progress: improved      Disposition  Final diagnoses:   Cervical radiculopathy   Paresthesias in left hand     Time reflects when diagnosis was documented in both MDM as applicable and the Disposition within this note     Time User Action Codes Description Comment    4/21/2021  1:00 PM Mike Pugh Add [X15 15] Cervical radiculopathy     4/21/2021  1:00 PM Mike Pugh Add [R20 2] Paresthesias in left hand       ED Disposition     ED Disposition Condition Date/Time Comment    Discharge Good Wed Apr 21, 2021  1:00 PM [facility] discharge to home/self care  Follow-up Information     Follow up With Specialties Details Why Contact Info Additional Information    Lissette Turner MD Pain Medicine Call in 1 day Please follow-up with Dr Kashif Mcdonald regarding your pain  [address] [facility]  [phone]       Physical Therapy at [facility] Physical [facility] Go to  Continue going to [facility] [address]  [address] [facility]  [phone] Physical Therapy at [facility], [facility] [address], [place], [place], [address] [facility] Emergency Department Emergency Medicine Go to  If symptoms worsen  [place] [id number]  [address] [facility] Emergency Department, [address], [address]          Discharge Medication List as of 4/21/2021  1:08 PM      START taking these medications    Details   gabapentin (NEURONTIN) 300 mg capsule Take 1 capsule (300 mg total) by mouth 3 (three) times a day Take 1 tablet on day 1 at bedtime   Then take 2 tablets on day 2, followed by 3 tablets on day 3 and every day after that as instructed by your doctor , Starting Wed 4/21/2021, Normal      morphine (MSIR) 15 mg tablet Take 0 5 tablets (7 5 mg total) by mouth every 4 (four) hours as needed for severe pain for up to 3 daysMax Daily Amount: 45 mg, Starting Wed 4/21/2021, Until Sat 4/24/2021, Normal         CONTINUE these medications which have NOT CHANGED    Details   amitriptyline (ELAVIL) 25 mg tablet Take 1 tablet (25 mg total) by mouth daily at bedtime, Starting Wed 3/31/2021, Until Fri 4/30/2021, Normal      ibuprofen (MOTRIN) 800 mg tablet Take 1 tablet (800 mg total) by mouth every 6 (six) hours as needed for mild pain, Starting Wed 12/23/2020, Normal      omeprazole (PriLOSEC) 20 mg delayed release capsule Take 20 mg by mouth daily, Historical Med           No discharge procedures on file      PDMP Review       Value Time User    PDMP Reviewed  Yes 4/21/2021 12:09 PM Malka Gee MD          ED Provider  Electronically Signed by           Malka Gee MD  04/21/21 4194

## 2021-04-21 NOTE — DISCHARGE INSTRUCTIONS
Instead of the amitriptyline which you are not currently taking anyway, start taking gabapentin  Take 1 tablet tomorrow at bedtime  If you tolerate this well, take 2 tablets the next day  If you tolerate this, take 3 tablets per day  Gabapentin can cause dizziness or drowsiness  Be careful driving or operating machinery  Take Tylenol 1000 mg every 6-8 hours for pain  Try over-the-counter lidocaine patches or creams as well  Take MSIR as prescribed  Follow-up with Dr Janine Estrella  Return to the ER with severe pain not controlled by the above medications, worsening hand weakness, new weakness of your legs or difficulty walking, new urinary incontinence, new urinary retention, or numbness/tingling in your anal or genital regions  Return with any other concerning symptoms

## 2021-04-21 NOTE — Clinical Note
Angel Abdi was seen and treated in our emergency department on 4/21/2021  Other - See Comments    No limitations once cleared  Diagnosis: Not included due to HIPAA  Mac Phalen  is off the rest of the shift today  He may return on this date: 04/22/2021         If you have any questions or concerns, please don't hesitate to call        Coco Singer MD    ______________________________           _______________          _______________  Hospital Representative                              Date                                Time

## 2021-04-23 ENCOUNTER — APPOINTMENT (OUTPATIENT)
Dept: PHYSICAL THERAPY | Facility: HOME HEALTHCARE | Age: 31
End: 2021-04-23
Payer: COMMERCIAL

## 2021-04-23 ENCOUNTER — OFFICE VISIT (OUTPATIENT)
Dept: PAIN MEDICINE | Facility: CLINIC | Age: 31
End: 2021-04-23
Payer: COMMERCIAL

## 2021-04-23 VITALS
WEIGHT: 180 LBS | DIASTOLIC BLOOD PRESSURE: 84 MMHG | BODY MASS INDEX: 28.93 KG/M2 | HEART RATE: 91 BPM | SYSTOLIC BLOOD PRESSURE: 132 MMHG | HEIGHT: 66 IN

## 2021-04-23 DIAGNOSIS — G89.4 CHRONIC PAIN SYNDROME: Primary | ICD-10-CM

## 2021-04-23 DIAGNOSIS — M54.12 CERVICAL RADICULOPATHY: ICD-10-CM

## 2021-04-23 DIAGNOSIS — M54.2 NECK PAIN: ICD-10-CM

## 2021-04-23 PROCEDURE — 99214 OFFICE O/P EST MOD 30 MIN: CPT | Performed by: NURSE PRACTITIONER

## 2021-04-23 NOTE — PROGRESS NOTES
Assessment:  1  Chronic pain syndrome    2  Neck pain    3  Cervical radiculopathy        Plan:   While the patient was in the office today, I did have a thorough conversation regarding their chronic pain syndrome, medication management, and treatment plan options  Patient is a 25-year-old male with chronic pain syndrome related to neck pain, left-sided cervical radiculopathy  He was initially seen here on 03/31/2021  It was recommended that he start physical therapy  Patient only attended physical therapy 3 times and states that his pain significantly increased  Pain became so intense that he went to the emergency room a couple days ago due to increased pain  At this point, we will order an MRI of his cervical spine to determine if there is any intraspinal pathology that would contribute to his current symptoms  He is aware that we will call him with results of the MRI soon as they are available for our review  Patient was trialed on amitriptyline 25 mg at bedtime  He states that was discontinued due to side effects  It caused him to feel " jittery"  patient was started on gabapentin 300 mg 3 times daily in the emergency room  Was just started 2 days ago  He denies side effects so far  He was given a short course of MSIR  He denies any relief with the MSIR  The patient will follow-up in 1 month for medication prescription refill and reevaluation  The patient was advised to contact the office should their symptoms worsen in the interim  The patient was agreeable and verbalized an understanding  History of Present Illness: The patient is a 27 y o  male who presents for a follow up office visit in regards to Shoulder Pain (Left) and Arm Pain (Left )  The patients current symptoms include   Complaints of neck and left upper extremity pain  Current pain level is an 8/10  Quality pain is described as sharp, shooting, numb, pins and needles      Current pain medications includes: Gabapentin 300 mg 3 times daily just started 3 days ago    The patient reports that this regimen is providing 0% pain relief  The patient is reporting no side effects from this pain medication regimen  I have personally reviewed and/or updated the patient's past medical history, past surgical history, family history, social history, current medications, allergies, and vital signs today  Review of Systems  Review of Systems   Constitutional: Negative for fatigue  HENT: Negative for ear pain and hearing loss  Eyes: Negative for redness  Respiratory: Negative for cough  Cardiovascular: Negative for leg swelling  Gastrointestinal: Negative for anal bleeding and rectal pain  Endocrine: Negative for polydipsia  Genitourinary: Negative for hematuria  Musculoskeletal: Negative for joint swelling  Skin: Negative for rash  Neurological: Negative for headaches  Hematological: Does not bruise/bleed easily  Psychiatric/Behavioral: Negative for behavioral problems and hallucinations  Past Medical History:   Diagnosis Date    Anesthesia     Pt reports " freaking out" when he was waking up- states he was pulling out IV   Anxiety     Asthma     Body piercing     Pt reports having an eyebrow piercing that cannot be removed   Broken teeth     Bronchitis     Constipation     Cough     Pt reports coughing "a lot"  He states he is a heavy smoker but also reports asthma  Pt does not have an inhaler  States he cannot get in to see a PCP because many are not taking new patients during Covid   Depression     GERD (gastroesophageal reflux disease)     Heart murmur     History of stomach ulcers     Insomnia     Pt state he "tosses and turns" all night long d/t pain   Kidney stone     Pt reports that stones passed on their own      Lumbar back pain with radiculopathy affecting left lower extremity     Pt reports falling off a roof and " breaking my back"    Pilonidal cyst Pt reports recurring cyst that drains at times    Pneumonia     Shortness of breath     Teeth missing        Past Surgical History:   Procedure Laterality Date    MANDIBLE SURGERY      [place] REMV PILONIDAL LESION EXTENS N/A 12/16/2020    Procedure: EXCISION PILONIDAL CYST;  Surgeon: Roberto Borges DO;  Location: [facility];  Service: General       Family History   Problem Relation Age of Onset    No Known Problems Mother     No Known Problems Father        Social History     Occupational History    Not on file   Tobacco Use    Smoking status: Current Every Day Smoker     Packs/day: 1 00     Years: 10 00     Pack years: 10 00     Types: Cigarettes    Smokeless tobacco: Former User    Tobacco comment: Pt quit smokeless tobacco ten years ago   Substance and Sexual Activity    Alcohol use: Not Currently    Drug use: Yes     Frequency: 7 0 times per week     Types: Marijuana     Comment: Pt smokes about a 1/4 pound a week  Wants to get a Medical  marijuana card    Sexual activity: Not Currently         Current Outpatient Medications:     gabapentin (NEURONTIN) 300 mg capsule, Take 1 capsule (300 mg total) by mouth 3 (three) times a day Take 1 tablet on day 1 at bedtime   Then take 2 tablets on day 2, followed by 3 tablets on day 3 and every day after that as instructed by your doctor , Disp: 87 capsule, Rfl: 0    morphine (MSIR) 15 mg tablet, Take 0 5 tablets (7 5 mg total) by mouth every 4 (four) hours as needed for severe pain for up to 3 daysMax Daily Amount: 45 mg, Disp: 9 tablet, Rfl: 0    omeprazole (PriLOSEC) 20 mg delayed release capsule, Take 20 mg by mouth daily, Disp: , Rfl:     ibuprofen (MOTRIN) 800 mg tablet, Take 1 tablet (800 mg total) by mouth every 6 (six) hours as needed for mild pain (Patient not taking: Reported on 4/23/2021), Disp: 30 tablet, Rfl: 2    Allergies   Allergen Reactions    Penicillins GI Intolerance     Pt reports nausea and vomiting    Adhesive [Medical Tape] Itching Pt reports a skin irritation- red and itchy   Amitriptyline Other (See Comments)     Restless legs    Nsaids      Pt has a h/o stomach ulcers  Physical Exam:    /84 (BP Location: Right arm, Patient Position: Sitting, Cuff Size: Standard)   Pulse 91   Ht 5' 6" (1 676 m)   Wt 81 6 kg (180 lb)   BMI 29 05 kg/m²     Constitutional:normal, well developed, well nourished, alert, in no distress and non-toxic and no overt pain behavior  Eyes:anicteric  HEENT:grossly intact  Neck:supple, symmetric, trachea midline and no masses   Pulmonary:even and unlabored  Cardiovascular:No edema or pitting edema present  Skin:Normal without rashes or lesions and well hydrated  Psychiatric:Mood and affect appropriate  Neurologic:Cranial Nerves II-XII grossly intact  Motor strength is intact in both upper extremities at +5 out of +5  Deep tendon reflexes are intact at +2 in bilateral biceps, triceps, brachioradialis    Musculoskeletal:normal    Imaging  MRI cervical spine without contrast    (Results Pending)       Orders Placed This Encounter   Procedures    MRI cervical spine without contrast

## 2021-04-28 ENCOUNTER — APPOINTMENT (OUTPATIENT)
Dept: PHYSICAL THERAPY | Facility: HOME HEALTHCARE | Age: 31
End: 2021-04-28
Payer: COMMERCIAL

## 2021-04-30 ENCOUNTER — APPOINTMENT (OUTPATIENT)
Dept: PHYSICAL THERAPY | Facility: HOME HEALTHCARE | Age: 31
End: 2021-04-30
Payer: COMMERCIAL

## 2021-05-02 ENCOUNTER — HOSPITAL ENCOUNTER (EMERGENCY)
Facility: HOSPITAL | Age: 31
Discharge: HOME | End: 2021-05-02
Attending: EMERGENCY MEDICINE
Payer: COMMERCIAL

## 2021-05-02 ENCOUNTER — APPOINTMENT (EMERGENCY)
Dept: CT IMAGING | Facility: HOSPITAL | Age: 31
End: 2021-05-02
Payer: COMMERCIAL

## 2021-05-02 ENCOUNTER — APPOINTMENT (EMERGENCY)
Dept: RADIOLOGY | Facility: HOSPITAL | Age: 31
End: 2021-05-02
Payer: COMMERCIAL

## 2021-05-02 VITALS
SYSTOLIC BLOOD PRESSURE: 118 MMHG | TEMPERATURE: 99.7 F | DIASTOLIC BLOOD PRESSURE: 63 MMHG | HEIGHT: 66 IN | WEIGHT: 175.71 LBS | HEART RATE: 60 BPM | RESPIRATION RATE: 18 BRPM | BODY MASS INDEX: 28.24 KG/M2 | OXYGEN SATURATION: 95 %

## 2021-05-02 DIAGNOSIS — R11.2 NAUSEA VOMITING AND DIARRHEA: ICD-10-CM

## 2021-05-02 DIAGNOSIS — R19.7 NAUSEA VOMITING AND DIARRHEA: ICD-10-CM

## 2021-05-02 DIAGNOSIS — J98.01 BRONCHOSPASM, ACUTE: Primary | ICD-10-CM

## 2021-05-02 DIAGNOSIS — E86.0 DEHYDRATION: ICD-10-CM

## 2021-05-02 LAB
ALBUMIN SERPL BCP-MCNC: 4 G/DL (ref 3.5–5)
ALP SERPL-CCNC: 96 U/L (ref 46–116)
ALT SERPL W P-5'-P-CCNC: 40 U/L (ref 12–78)
ANION GAP SERPL CALCULATED.3IONS-SCNC: 13 MMOL/L (ref 4–13)
AST SERPL W P-5'-P-CCNC: 23 U/L (ref 5–45)
ATRIAL RATE: 89 BPM
BACTERIA UR QL AUTO: NORMAL /HPF
BASOPHILS # BLD AUTO: 0.04 THOUSANDS/ΜL (ref 0–0.1)
BASOPHILS NFR BLD AUTO: 0 % (ref 0–1)
BILIRUB SERPL-MCNC: 0.51 MG/DL (ref 0.2–1)
BILIRUB UR QL STRIP: ABNORMAL
BUN SERPL-MCNC: 11 MG/DL (ref 5–25)
CALCIUM SERPL-MCNC: 9 MG/DL (ref 8.3–10.1)
CHLORIDE SERPL-SCNC: 100 MMOL/L (ref 100–108)
CLARITY UR: CLEAR
CO2 SERPL-SCNC: 25 MMOL/L (ref 21–32)
COLOR UR: ABNORMAL
CREAT SERPL-MCNC: 1.04 MG/DL (ref 0.6–1.3)
D DIMER PPP FEU-MCNC: 1.44 UG/ML FEU
EOSINOPHIL # BLD AUTO: 0.03 THOUSAND/ΜL (ref 0–0.61)
EOSINOPHIL NFR BLD AUTO: 0 % (ref 0–6)
ERYTHROCYTE [DISTWIDTH] IN BLOOD BY AUTOMATED COUNT: 13.3 % (ref 11.6–15.1)
GFR SERPL CREATININE-BSD FRML MDRD: 95 ML/MIN/1.73SQ M
GLUCOSE SERPL-MCNC: 118 MG/DL (ref 65–140)
GLUCOSE UR STRIP-MCNC: NEGATIVE MG/DL
HCT VFR BLD AUTO: 50 % (ref 36.5–49.3)
HGB BLD-MCNC: 17.1 G/DL (ref 12–17)
HGB UR QL STRIP.AUTO: ABNORMAL
IMM GRANULOCYTES # BLD AUTO: 0.06 THOUSAND/UL (ref 0–0.2)
IMM GRANULOCYTES NFR BLD AUTO: 1 % (ref 0–2)
KETONES UR STRIP-MCNC: ABNORMAL MG/DL
LACTATE SERPL-SCNC: 1.3 MMOL/L (ref 0.5–2)
LEUKOCYTE ESTERASE UR QL STRIP: NEGATIVE
LIPASE SERPL-CCNC: 155 U/L (ref 73–393)
LYMPHOCYTES # BLD AUTO: 1.4 THOUSANDS/ΜL (ref 0.6–4.47)
LYMPHOCYTES NFR BLD AUTO: 13 % (ref 14–44)
MAGNESIUM SERPL-MCNC: 2.4 MG/DL (ref 1.6–2.6)
MCH RBC QN AUTO: 31 PG (ref 26.8–34.3)
MCHC RBC AUTO-ENTMCNC: 34.2 G/DL (ref 31.4–37.4)
MCV RBC AUTO: 91 FL (ref 82–98)
MONOCYTES # BLD AUTO: 1.24 THOUSAND/ΜL (ref 0.17–1.22)
MONOCYTES NFR BLD AUTO: 12 % (ref 4–12)
NEUTROPHILS # BLD AUTO: 7.87 THOUSANDS/ΜL (ref 1.85–7.62)
NEUTS SEG NFR BLD AUTO: 74 % (ref 43–75)
NITRITE UR QL STRIP: NEGATIVE
NON-SQ EPI CELLS URNS QL MICRO: NORMAL /HPF
NRBC BLD AUTO-RTO: 0 /100 WBCS
P AXIS: 66 DEGREES
PH UR STRIP.AUTO: 7 [PH]
PLATELET # BLD AUTO: 304 THOUSANDS/UL (ref 149–390)
PMV BLD AUTO: 10.4 FL (ref 8.9–12.7)
POTASSIUM SERPL-SCNC: 3.5 MMOL/L (ref 3.5–5.3)
PR INTERVAL: 124 MS
PROT SERPL-MCNC: 8.1 G/DL (ref 6.4–8.2)
PROT UR STRIP-MCNC: ABNORMAL MG/DL
QRS AXIS: 77 DEGREES
QRSD INTERVAL: 78 MS
QT INTERVAL: 368 MS
QTC INTERVAL: 447 MS
RBC # BLD AUTO: 5.52 MILLION/UL (ref 3.88–5.62)
RBC #/AREA URNS AUTO: NORMAL /HPF
SARS-COV-2 RNA RESP QL NAA+PROBE: NEGATIVE
SODIUM SERPL-SCNC: 138 MMOL/L (ref 136–145)
SP GR UR STRIP.AUTO: 1.01 (ref 1–1.03)
T WAVE AXIS: 71 DEGREES
TROPONIN I SERPL-MCNC: <0.02 NG/ML
TSH SERPL DL<=0.05 MIU/L-ACNC: 0.45 UIU/ML (ref 0.36–3.74)
UROBILINOGEN UR QL STRIP.AUTO: 1 E.U./DL
VENTRICULAR RATE: 89 BPM
WBC # BLD AUTO: 10.64 THOUSAND/UL (ref 4.31–10.16)
WBC #/AREA URNS AUTO: NORMAL /HPF

## 2021-05-02 PROCEDURE — U0003 INFECTIOUS AGENT DETECTION BY NUCLEIC ACID (DNA OR RNA); SEVERE ACUTE RESPIRATORY SYNDROME CORONAVIRUS 2 (SARS-COV-2) (CORONAVIRUS DISEASE [COVID-19]), AMPLIFIED PROBE TECHNIQUE, MAKING USE OF HIGH THROUGHPUT TECHNOLOGIES AS DESCRIBED BY CMS-2020-01-R: HCPCS | Performed by: EMERGENCY MEDICINE

## 2021-05-02 PROCEDURE — 81001 URINALYSIS AUTO W/SCOPE: CPT | Performed by: EMERGENCY MEDICINE

## 2021-05-02 PROCEDURE — 74177 CT ABD & PELVIS W/CONTRAST: CPT

## 2021-05-02 PROCEDURE — 36415 COLL VENOUS BLD VENIPUNCTURE: CPT | Performed by: EMERGENCY MEDICINE

## 2021-05-02 PROCEDURE — 93005 ELECTROCARDIOGRAM TRACING: CPT

## 2021-05-02 PROCEDURE — 84443 ASSAY THYROID STIM HORMONE: CPT | Performed by: EMERGENCY MEDICINE

## 2021-05-02 PROCEDURE — U0005 INFEC AGEN DETEC AMPLI PROBE: HCPCS | Performed by: EMERGENCY MEDICINE

## 2021-05-02 PROCEDURE — 71275 CT ANGIOGRAPHY CHEST: CPT

## 2021-05-02 PROCEDURE — 85025 COMPLETE CBC W/AUTO DIFF WBC: CPT | Performed by: EMERGENCY MEDICINE

## 2021-05-02 PROCEDURE — 83735 ASSAY OF MAGNESIUM: CPT | Performed by: EMERGENCY MEDICINE

## 2021-05-02 PROCEDURE — 99285 EMERGENCY DEPT VISIT HI MDM: CPT | Performed by: EMERGENCY MEDICINE

## 2021-05-02 PROCEDURE — 85379 FIBRIN DEGRADATION QUANT: CPT | Performed by: EMERGENCY MEDICINE

## 2021-05-02 PROCEDURE — 71045 X-RAY EXAM CHEST 1 VIEW: CPT

## 2021-05-02 PROCEDURE — 83605 ASSAY OF LACTIC ACID: CPT | Performed by: EMERGENCY MEDICINE

## 2021-05-02 PROCEDURE — 84484 ASSAY OF TROPONIN QUANT: CPT | Performed by: EMERGENCY MEDICINE

## 2021-05-02 PROCEDURE — 93010 ELECTROCARDIOGRAM REPORT: CPT | Performed by: INTERNAL MEDICINE

## 2021-05-02 PROCEDURE — 99284 EMERGENCY DEPT VISIT MOD MDM: CPT

## 2021-05-02 PROCEDURE — 83690 ASSAY OF LIPASE: CPT | Performed by: EMERGENCY MEDICINE

## 2021-05-02 PROCEDURE — 96366 THER/PROPH/DIAG IV INF ADDON: CPT

## 2021-05-02 PROCEDURE — 96365 THER/PROPH/DIAG IV INF INIT: CPT

## 2021-05-02 PROCEDURE — G1004 CDSM NDSC: HCPCS

## 2021-05-02 PROCEDURE — 80053 COMPREHEN METABOLIC PANEL: CPT | Performed by: EMERGENCY MEDICINE

## 2021-05-02 PROCEDURE — 96375 TX/PRO/DX INJ NEW DRUG ADDON: CPT

## 2021-05-02 RX ORDER — LORAZEPAM 2 MG/ML
0.5 INJECTION INTRAMUSCULAR ONCE
Status: COMPLETED | OUTPATIENT
Start: 2021-05-02 | End: 2021-05-02

## 2021-05-02 RX ORDER — ALBUTEROL SULFATE 90 UG/1
2 AEROSOL, METERED RESPIRATORY (INHALATION) ONCE
Status: COMPLETED | OUTPATIENT
Start: 2021-05-02 | End: 2021-05-02

## 2021-05-02 RX ORDER — DEXAMETHASONE SODIUM PHOSPHATE 10 MG/ML
10 INJECTION, SOLUTION INTRAMUSCULAR; INTRAVENOUS ONCE
Status: COMPLETED | OUTPATIENT
Start: 2021-05-02 | End: 2021-05-02

## 2021-05-02 RX ORDER — ONDANSETRON 2 MG/ML
4 INJECTION INTRAMUSCULAR; INTRAVENOUS ONCE
Status: COMPLETED | OUTPATIENT
Start: 2021-05-02 | End: 2021-05-02

## 2021-05-02 RX ORDER — ONDANSETRON 4 MG/1
4 TABLET, ORALLY DISINTEGRATING ORAL EVERY 8 HOURS PRN
Qty: 20 TABLET | Refills: 0 | Status: ON HOLD | OUTPATIENT
Start: 2021-05-02 | End: 2021-06-02

## 2021-05-02 RX ORDER — ALBUTEROL SULFATE 90 UG/1
2 AEROSOL, METERED RESPIRATORY (INHALATION) EVERY 4 HOURS PRN
Qty: 6.7 G | Refills: 0 | Status: SHIPPED | OUTPATIENT
Start: 2021-05-02 | End: 2022-05-02

## 2021-05-02 RX ORDER — DEXAMETHASONE 2 MG/1
TABLET ORAL
Qty: 5 TABLET | Refills: 0 | Status: ON HOLD | OUTPATIENT
Start: 2021-05-02 | End: 2021-06-02

## 2021-05-02 RX ORDER — OMEPRAZOLE 20 MG/1
20 CAPSULE, DELAYED RELEASE ORAL DAILY
Qty: 30 CAPSULE | Refills: 0 | Status: ON HOLD | OUTPATIENT
Start: 2021-05-02 | End: 2021-06-02

## 2021-05-02 RX ADMIN — ONDANSETRON 4 MG: 2 INJECTION INTRAMUSCULAR; INTRAVENOUS at 04:12

## 2021-05-02 RX ADMIN — DEXAMETHASONE SODIUM PHOSPHATE 10 MG: 10 INJECTION, SOLUTION INTRAMUSCULAR; INTRAVENOUS at 08:37

## 2021-05-02 RX ADMIN — LORAZEPAM 0.5 MG: 2 INJECTION INTRAMUSCULAR; INTRAVENOUS at 05:51

## 2021-05-02 RX ADMIN — SODIUM CHLORIDE, SODIUM LACTATE, POTASSIUM CHLORIDE, AND CALCIUM CHLORIDE 1000 ML: .6; .31; .03; .02 INJECTION, SOLUTION INTRAVENOUS at 04:12

## 2021-05-02 RX ADMIN — IOHEXOL 100 ML: 350 INJECTION, SOLUTION INTRAVENOUS at 06:13

## 2021-05-02 RX ADMIN — SODIUM CHLORIDE, SODIUM LACTATE, POTASSIUM CHLORIDE, AND CALCIUM CHLORIDE 1000 ML: .6; .31; .03; .02 INJECTION, SOLUTION INTRAVENOUS at 05:50

## 2021-05-02 RX ADMIN — ALBUTEROL SULFATE 2 PUFF: 90 AEROSOL, METERED RESPIRATORY (INHALATION) at 05:50

## 2021-05-02 RX ADMIN — FAMOTIDINE 20 MG: 10 INJECTION, SOLUTION INTRAVENOUS at 04:12

## 2021-05-02 NOTE — ED PROVIDER NOTES
History  Chief Complaint   Patient presents with    Vomiting     N/V/D for 2 days, with chills, body aches, and a cough     70-year-old male presents with 2 day history of chills body aches cough he feels short of breath he has been diaphoretic he has been vomiting to the point of dry heaves and unable to keep anything down  He is denying any abdominal pain the diarrhea has been watery he denies any melena or hematochezia his girlfriend is feeling ill today but she does have any specific symptoms he denies any fever cough is overall been nonproductive  He denies any lightheadedness there has been no trauma or falls he denies prior history of inflammatory bowel disease his breathing feels tight he is currently on gabapentin and omeprazole he was started on 2 new psych meds 4 days ago but he is unable to name them and current pharmacies are closed  Patient has a known history of a cervical radiculopathy there is no new numbness tingling or weakness  No prior history of DVT or PE  Prior to Admission Medications   Prescriptions Last Dose Informant Patient Reported? Taking?   gabapentin (NEURONTIN) 300 mg capsule 5/1/2021 at Unknown time  No Yes   Sig: Take 1 capsule (300 mg total) by mouth 3 (three) times a day Take 1 tablet on day 1 at bedtime  Then take 2 tablets on day 2, followed by 3 tablets on day 3 and every day after that as instructed by your doctor  omeprazole (PriLOSEC) 20 mg delayed release capsule   Yes Yes   Sig: Take 20 mg by mouth daily      Facility-Administered Medications: None       Past Medical History:   Diagnosis Date    Anesthesia     Pt reports " freaking out" when he was waking up- states he was pulling out IV   Anxiety     Asthma     Body piercing     Pt reports having an eyebrow piercing that cannot be removed   Broken teeth     Bronchitis     Constipation     Cough     Pt reports coughing "a lot"  He states he is a heavy smoker but also reports asthma   Pt does not have an inhaler  States he cannot get in to see a PCP because many are not taking new patients during Covid   Depression     GERD (gastroesophageal reflux disease)     Heart murmur     History of stomach ulcers     Insomnia     Pt state he "tosses and turns" all night long d/t pain   Kidney stone     Pt reports that stones passed on their own   Lumbar back pain with radiculopathy affecting left lower extremity     Pt reports falling off a roof and " breaking my back"    Pilonidal cyst     Pt reports recurring cyst that drains at times    Pneumonia     Shortness of breath     Teeth missing        Past Surgical History:   Procedure Laterality Date    MANDIBLE SURGERY      HI REMV PILONIDAL LESION EXTENS N/A 12/16/2020    Procedure: EXCISION PILONIDAL CYST;  Surgeon: Blake Lawson DO;  Location: [facility];  Service: General       Family History   Problem Relation Age of Onset    No Known Problems Mother     No Known Problems Father      I have reviewed and agree with the history as documented  E-Cigarette/Vaping    E-Cigarette Use Never User      E-Cigarette/Vaping Substances    Nicotine No     THC No     CBD No     Flavoring No     Other No     Unknown No      Social History     Tobacco Use    Smoking status: Current Every Day Smoker     Packs/day: 1 00     Years: 10 00     Pack years: 10 00     Types: Cigarettes    Smokeless tobacco: Former User    Tobacco comment: Pt quit smokeless tobacco ten years ago   Substance Use Topics    Alcohol use: Not Currently    Drug use: Yes     Frequency: 7 0 times per week     Types: Marijuana     Comment: Pt smokes about a 1/4 pound a week  Wants to get a Medical  marijuana card       Review of Systems   Constitutional: Positive for appetite change, chills and diaphoresis  Negative for activity change and fever  HENT: Negative for congestion, ear pain, rhinorrhea, sneezing and sore throat  Eyes: Negative for discharge     Respiratory: Positive for cough, chest tightness and shortness of breath  Negative for wheezing  Cardiovascular: Negative for chest pain and leg swelling  Gastrointestinal: Positive for diarrhea, nausea and vomiting  Negative for abdominal pain and blood in stool  Endocrine: Negative for polyuria  Genitourinary: Negative for difficulty urinating, dysuria, frequency and urgency  Musculoskeletal: Negative for back pain and myalgias  Skin: Negative for rash  Neurological: Negative for dizziness, weakness, light-headedness and headaches  Numbness: Chronic to the bilateral upper extremities from a cervical radiculopathy  Hematological: Negative for adenopathy  Psychiatric/Behavioral: Negative for confusion  All other systems reviewed and are negative  Physical Exam  Physical Exam  Vitals signs and nursing note reviewed  Constitutional:       General: He is not in acute distress  Appearance: He is well-developed  He is not diaphoretic  HENT:      Head: Normocephalic and atraumatic  Right Ear: Tympanic membrane and external ear normal       Left Ear: Tympanic membrane and external ear normal       Nose: Nose normal       Mouth/Throat:      Mouth: Mucous membranes are moist    Eyes:      General: No scleral icterus  Right eye: No discharge  Left eye: No discharge  Conjunctiva/sclera: Conjunctivae normal       Pupils: Pupils are equal, round, and reactive to light  Neck:      Musculoskeletal: Normal range of motion and neck supple  No neck rigidity  Cardiovascular:      Rate and Rhythm: Normal rate and regular rhythm  Heart sounds: Normal heart sounds  Pulmonary:      Effort: Pulmonary effort is normal  No respiratory distress  Breath sounds: No wheezing, rhonchi or rales  Comments: Prolongation of expiratory phase but no wheezes or rhonchi  Abdominal:      General: Bowel sounds are normal  There is no distension  Palpations: Abdomen is soft   There is no mass       Tenderness: There is no abdominal tenderness  There is no right CVA tenderness, left CVA tenderness, guarding or rebound  Comments: Back: no mildline or CVA tenderness   Musculoskeletal: Normal range of motion  General: No tenderness or deformity  Right lower leg: No edema  Left lower leg: No edema  Lymphadenopathy:      Cervical: No cervical adenopathy  Skin:     General: Skin is warm and dry  Neurological:      Mental Status: He is alert and oriented to person, place, and time  Mental status is at baseline  Cranial Nerves: No cranial nerve deficit  Sensory: No sensory deficit  Motor: No weakness or abnormal muscle tone        Coordination: Coordination normal       Gait: Gait normal       Deep Tendon Reflexes: Reflexes normal       Comments: Gait steady   Psychiatric:         Mood and Affect: Mood normal          Vital Signs  ED Triage Vitals [05/02/21 0346]   Temperature Pulse Respirations Blood Pressure SpO2   99 7 °F (37 6 °C) 98 18 129/84 96 %      Temp Source Heart Rate Source Patient Position - Orthostatic VS BP Location FiO2 (%)   Temporal Monitor Lying Right arm --      Pain Score       5           Vitals:    05/02/21 0645 05/02/21 0700 05/02/21 0730 05/02/21 0745   BP:    118/63   Pulse: 67 60 57 60   Patient Position - Orthostatic VS:    Lying         Visual Acuity      ED Medications  Medications   lactated ringers bolus 1,000 mL (0 mL Intravenous Stopped 5/2/21 0553)   ondansetron (ZOFRAN) injection 4 mg (4 mg Intravenous Given 5/2/21 0412)   famotidine (PEPCID) injection 20 mg (20 mg Intravenous Given 5/2/21 0412)   LORazepam (ATIVAN) injection 0 5 mg (0 5 mg Intravenous Given 5/2/21 0551)   albuterol (PROVENTIL HFA,VENTOLIN HFA) inhaler 2 puff (2 puffs Inhalation Given 5/2/21 0550)   lactated ringers bolus 1,000 mL (0 mL Intravenous Stopped 5/2/21 0728)   iohexol (OMNIPAQUE) 350 MG/ML injection (SINGLE-DOSE) 100 mL (100 mL Intravenous Given 5/2/21 1956)   dexamethasone (PF) (DECADRON) injection 10 mg (10 mg Intravenous Given 5/2/21 0837)       Diagnostic Studies  Results Reviewed     Procedure Component Value Units Date/Time    Urine Microscopic [[id number]]  (Normal) Collected: 05/02/21 0644    Lab Status: Final result Specimen: Urine, Clean Catch Updated: 05/02/21 0703     RBC, UA 2-4 /hpf      WBC, UA 0-1 /hpf      Epithelial Cells Occasional /hpf      Bacteria, UA Occasional /hpf     UA w Reflex to Microscopic w Reflex to Culture [[id number]]  (Abnormal) Collected: 05/02/21 0644    Lab Status: Final result Specimen: Urine, Clean Catch Updated: 05/02/21 0650     Color, UA Orange     Clarity, UA Clear     Specific Gravity, UA 1 010     pH, UA 7 0     Leukocytes, UA Negative     Nitrite, UA Negative     Protein, UA 30 (1+) mg/dl      Glucose, UA Negative mg/dl      Ketones, UA 15 (1+) mg/dl      Urobilinogen, UA 1 0 E U /dl      Bilirubin, UA Interference- unable to analyze     Blood, UA Small    Novel Coronavirus (Covid-19),PCR [facility] - 2 Hour Stat [[id number]]  (Normal) Collected: 05/02/21 0413    Lab Status: Final result Specimen: Nares from Nasopharyngeal Swab Updated: 05/02/21 0518     SARS-CoV-2 Negative    Narrative: The specimen collection materials, transport medium, and/or testing methodology utilized in the production of these test results have been proven to be reliable in a limited validation with an abbreviated program under the Emergency Utilization Authorization provided by the FDA  Testing reported as "Presumptive positive" will be confirmed with secondary testing to ensure result accuracy  Clinical caution and judgement should be used with the interpretation of these results with consideration of the clinical impression and other laboratory testing  Testing reported as "Positive" or "Negative" has been proven to be accurate according to standard laboratory validation requirements    All testing is performed with control materials showing appropriate reactivity at standard intervals  Lipase [[id number]]  (Normal) Collected: 05/02/21 0413    Lab Status: Final result Specimen: Blood from Arm, Right Updated: 05/02/21 0451     Lipase 155 u/L     Magnesium [[id number]]  (Normal) Collected: 05/02/21 0413    Lab Status: Final result Specimen: Blood from Arm, Right Updated: 05/02/21 0451     Magnesium 2 4 mg/dL     TSH, 3rd generation with Free T4 reflex [[id number]]  (Normal) Collected: 05/02/21 0413    Lab Status: Final result Specimen: Blood from Arm, Right Updated: 05/02/21 0451     TSH 3RD GENERATON 0 454 uIU/mL     Narrative:      Patients undergoing fluorescein dye angiography may retain small amounts of fluorescein in the body for 48-72 hours post procedure  Samples containing fluorescein can produce falsely depressed TSH values  If the patient had this procedure,a specimen should be resubmitted post fluorescein clearance  Troponin I [[id number]]  (Normal) Collected: 05/02/21 0413    Lab Status: Final result Specimen: Blood from Arm, Right Updated: 05/02/21 0444     Troponin I <0 02 ng/mL     Lactic acid [[id number]]  (Normal) Collected: 05/02/21 0413    Lab Status: Final result Specimen: Blood from Arm, Right Updated: 05/02/21 0444     LACTIC ACID 1 3 mmol/L     Narrative:      Result may be elevated if tourniquet was used during collection      Comprehensive metabolic panel [[id number]] Collected: 05/02/21 0413    Lab Status: Final result Specimen: Blood from Arm, Right Updated: 05/02/21 0443     Sodium 138 mmol/L      Potassium 3 5 mmol/L      Chloride 100 mmol/L      CO2 25 mmol/L      ANION GAP 13 mmol/L      BUN 11 mg/dL      Creatinine 1 04 mg/dL      Glucose 118 mg/dL      Calcium 9 0 mg/dL      AST 23 U/L      ALT 40 U/L      Alkaline Phosphatase 96 U/L      Total Protein 8 1 g/dL      Albumin 4 0 g/dL      Total Bilirubin 0 51 mg/dL      eGFR 95 ml/min/1 73sq m     Narrative:      [place] guidelines for Chronic Kidney Disease (CKD):     Stage 1 with normal or high GFR (GFR > 90 mL/min/1 73 square meters)    Stage 2 Mild CKD (GFR = 60-89 mL/min/1 73 square meters)    Stage 3A Moderate CKD (GFR = 45-59 mL/min/1 73 square meters)    Stage 3B Moderate CKD (GFR = 30-44 mL/min/1 73 square meters)    Stage 4 Severe CKD (GFR = 15-29 mL/min/1 73 square meters)    Stage 5 End Stage CKD (GFR <15 mL/min/1 73 square meters)  Note: GFR calculation is accurate only with a steady state creatinine    D-Dimer [[id number]]  (Abnormal) Collected: 05/02/21 0413    Lab Status: Final result Specimen: Blood from Arm, Right Updated: 05/02/21 0438     D-Dimer, Quant 1 44 ug/ml FEU     CBC and differential [[id number]]  (Abnormal) Collected: 05/02/21 0413    Lab Status: Final result Specimen: Blood from Arm, Right Updated: 05/02/21 0426     WBC 10 64 Thousand/uL      RBC 5 52 Million/uL      Hemoglobin 17 1 g/dL      Hematocrit 50 0 %      MCV 91 fL      MCH 31 0 pg      MCHC 34 2 g/dL      RDW 13 3 %      MPV 10 4 fL      Platelets 166 Thousands/uL      nRBC 0 /100 WBCs      Neutrophils Relative 74 %      Immat GRANS % 1 %      Lymphocytes Relative 13 %      Monocytes Relative 12 %      Eosinophils Relative 0 %      Basophils Relative 0 %      Neutrophils Absolute 7 87 Thousands/µL      Immature Grans Absolute 0 06 Thousand/uL      Lymphocytes Absolute 1 40 Thousands/µL      Monocytes Absolute 1 24 Thousand/µL      Eosinophils Absolute 0 03 Thousand/µL      Basophils Absolute 0 04 Thousands/µL                  PE Study with CT Abdomen and Pelvis with contrast   Final Result by Chava Márquez DO (05/02 0622)   1  Unremarkable CT pulmonary angiogram   No CT evidence of pulmonary emboli  2   Unremarkable contrast-enhanced CT abdomen pelvis  No CT etiology to account for the patient's symptoms        Workstation performed: [id number]         XR chest 1 view portable   ED Interpretation by Aubrey Vazquez MD ([id number])   Read by me; Radiologist to provide formal interpretation  No acute process no prev available                 Procedures  ECG 12 Lead Documentation Only    Date/Time: 5/2/2021 4:44 AM  Performed by: Maritza Ferrell MD  Authorized by: Maritza Ferrell MD     Indications / Diagnosis:  Vomiting  ECG reviewed by me, the ED Provider: yes    Patient location:  ED  Previous ECG:     Previous ECG:  Unavailable  Rate:     ECG rate:  89    ECG rate assessment: normal    Rhythm:     Rhythm: sinus rhythm    QRS:     QRS axis:  Normal  Comments:      No acute ischemic changes             ED Course                             SBIRT 20yo+      Most Recent Value   SBIRT (25 yo +)   In order to provide better care to our patients, we are screening all of our patients for alcohol and drug use  Would it be okay to ask you these screening questions? No Filed at: 05/02/2021 1187          Wells' Criteria for PE      Most Recent Value   Wells' Criteria for PE   Clinical signs and symptoms of DVT  0 Filed at: 05/02/2021 5049   PE is primary diagnosis or equally likely  3 Filed at: 05/02/2021 0545   HR >100  0 Filed at: 05/02/2021 0545   Immobilization at least 3 days or Surgery in the previous 4 weeks  0 Filed at: 05/02/2021 0545   Previous, objectively diagnosed PE or DVT  0 Filed at: 05/02/2021 0545   Hemoptysis  0 Filed at: 05/02/2021 0545   Malignancy with treatment within 6 months or palliative  0 Filed at: 05/02/2021 0545   Wells' Criteria Total  3 Filed at: 05/02/2021 0545                MDM  Number of Diagnoses or Management Options  Bronchospasm, acute:   Dehydration:   Nausea vomiting and diarrhea:   Diagnosis management comments: Mdm:  56-year-old male with presentation consistent with viral syndrome cough muscle aches fatigue shortness of breath vomiting and diarrhea  Will check for COVID evaluate electrolytes rule out DVT and acute coronary syndrome  Initiate symptomatic management and re-evaluate      Patient felt improved after albuterol meter dose inhaler  Pursued CTA of the chest as well as abdomen pelvis to evaluate possibility of pulmonary embolism and or colitis  Reviewed results paper patient was able tolerate p o  Fluids prior to discharge will cover with dexamethasone recommend probiotics for diarrhea Zofran as needed reviewed low residue diet recommended he establish with a family doctor in the area patient was receptive  Disposition  Final diagnoses:   Bronchospasm, acute   Nausea vomiting and diarrhea   Dehydration     Time reflects when diagnosis was documented in both MDM as applicable and the Disposition within this note     Time User Action Codes Description Comment    5/2/2021  8:34 AM Milagros Radish Add [J98 01] Bronchospasm, acute     5/2/2021  8:35 AM Milagros Radish Add [R11 2,  R19 7] Nausea vomiting and diarrhea     5/2/2021  8:35 AM Milagros Radish Add [E86 0] Dehydration       ED Disposition     ED Disposition Condition Date/Time Comment    Discharge Stable [place] May 2, 2021  8:34 AM Mukesh Patterson discharge to home/self care  Follow-up Information     Follow up With Specialties Details Why Contact Info Additional [address] [facility] [address] Family Medicine In 2 days recheck of symptoms [address] [facility] [id number]  [address] [facility] [address] [facility], [address] [place] [address], [address] [facility]          Discharge Medication List as of 5/2/2021  8:41 AM      START taking these medications    Details   albuterol (PROVENTIL HFA,VENTOLIN HFA) 90 mcg/act inhaler Inhale 2 puffs every 4 (four) hours as needed for wheezing, Starting Sun 5/2/2021, Until Mon 5/2/2022, Normal      dexamethasone (DECADRON) 2 mg tablet Take with food   Take one time dose of 10mg by mouth tomorrow, Normal      !! omeprazole (PriLOSEC) 20 mg delayed release capsule Take 1 capsule (20 mg total) by mouth daily for 30 doses, Starting Sun 5/2/2021, Until Tue 6/1/2021, Normal      ondansetron (ZOFRAN-ODT) 4 mg disintegrating tablet Take 1 tablet (4 mg total) by mouth every 8 (eight) hours as needed for nausea or vomiting for up to 20 doses, Starting Sun 5/2/2021, Normal       !! - Potential duplicate medications found  Please discuss with provider  CONTINUE these medications which have NOT CHANGED    Details   gabapentin (NEURONTIN) 300 mg capsule Take 1 capsule (300 mg total) by mouth 3 (three) times a day Take 1 tablet on day 1 at bedtime  Then take 2 tablets on day 2, followed by 3 tablets on day 3 and every day after that as instructed by your doctor , Starting Wed 4/21/2021, Normal      !! omeprazole (PriLOSEC) 20 mg delayed release capsule Take 20 mg by mouth daily, Historical Med       !! - Potential duplicate medications found  Please discuss with provider  No discharge procedures on file      PDMP Review       Value Time User    PDMP Reviewed  Yes 4/21/2021 12:09 PM Luis Armando Tripathi MD          ED Provider  Electronically Signed by           Elaina Conrell MD  05/02/21 4461

## 2021-05-02 NOTE — ED NOTES
When I reviewed med list with pt he reported being on the only two medications listed in his med rec   He reports to the doctor that he "is on 2 new psych meds but he doesn't know what they are "     Nabila Tiwari, DIAMOND  05/02/21 5801

## 2021-05-02 NOTE — ED NOTES
Pt aware of need for Urine sample  He is unable to go at this time          Baljit Olmstead, DIAMOND  05/02/21 9332

## 2021-05-02 NOTE — DISCHARGE INSTRUCTIONS
Establish with family doctor  Plenty of fluids Gatorade Powerade Jell-O freeze pops diluted apple juice  Resume omeprazole  Zofran as needed for nausea  Albuterol meter dose inhaler 2 puffs every 4 hours as needed for the next 2-3 days  Additional 10 mg of dexamethasone tomorrow to help wheezing  [place] diet bananas rice applesauce toast crackers noodles soup  Avoid peas corn fiber and raw vegetables for 1 week to help with diarrhea  Probiotics over-the-counter to help with diarrhea  Return with fever inability keep fluids down lightheadedness needing meter dose inhaler more often than every 4 hours abdominal pain dark tarry stools red stool or any new or worsening symptoms

## 2021-05-03 NOTE — PROGRESS NOTES
PT Discharge    Today's date: 5/3/2021  Patient name: Meli Vázquez  : 1990  MRN: [id number]  Referring provider: Mane Rose MD  Dx:   Encounter Diagnosis     ICD-10-CM    1  Neck pain  M54 2        Start Time: 1055  Stop Time: 1125  Total time in clinic (min): 30 minutes    Assessment  Assessment details: Pt Meli Vázquez is a 27 y o  who presents to OPPT with s/s consistent with cervical radiculopathy  PT notes moderate limitations with cervical mobility but no significant pain  He has more noted pain with L shoulder mobility, with joint laxity and anterior apprehension observed  He has mild limitations with L UE ROM and strength 2* to pain  Pt states that he can't lift/carry, reaching behind his back, reach overhead, or sleep comfortably due to increase N/T in the L hand  Pt would benefit from skilled therapy services to address outlined impairments, work towards goals, and restore pts PLOF  Thank you! UPDATE: Pt with frequent no show/cancellations of therapy appointments; per [facility]'s Physical Therapy policy pt will be Discharged from POC at this time  PT unable to update clinical findings as pt did not return to therapy for assessment  Please refer to previous sessions for details  Thank you!        Impairments: abnormal or restricted ROM, abnormal movement, activity intolerance, impaired physical strength, lacks appropriate home exercise program, pain with function and poor posture   Understanding of Dx/Px/POC: fair   Prognosis: fair    Goals  Goals NOT MET 2* pt did not complete POC per orders    STGs to be achieved in 4 weeks:  -Pt to demonstrate reduced subjective pain rating "at worst" by at least 2-3 points from Initial Eval to allow for reduced pain with ADLs and improved functional activity tolerance    -Pt to demonstrate C/S ROM with minimal restrictions in order to maximize joint mobility and function and allow for progression of exercise program and achievement of goals    -Pt to demonstrate increased MMT of LUE by at least 1/2 grade in order to improve safety and stability with ADLs and functional mobility  LTGs to be achieved upon discharge:   -Pt will be I with HEP in order to continue to improve quality of life and independence and reduce risk for re-injury    -Pt to demonstrate return to activities of daily living without limiations or restrictions    -Pt will return to lifting > 10# with L UE to help facilitate return to household activities independently   -Pt to demonstrate return to work activities without limitations or restrictions    -Pt to demonstrate improved function as noted by achieving or exceeding predicted score on FOTO outcomes assessment tool  Plan  Plan details: Pt to be D/C from OPPT   Frequency: 2x week  Duration in weeks: 4  Plan of Care beginning date: 2021  Plan of Care expiration date: 2021  Treatment plan discussed with: patient        Subjective Evaluation    History of Present Illness  Mechanism of injury: Pt notes that he began to have pain in L shoulder about 2 months ago  He went to the emergency department on 3/3/21 with pain in the left shoulder and left arm  Pain is present at rest but worse with active range of motion in most planes of motion  Patient states that he woke up with his neck flexed and rotated to the left side approximately 2 months ago; he required the assistance of his significant other to move his neck back into its normal position  Since that time, he has had pain in the left shoulder with paresthesias radiating from the left side of his neck into the left upper extremity  More recently, he has noted some sensation of weakness in the left upper extremity  He was referred from ED to follow up with pain management  Dr Arielle Adamson ordered EMG and MRI; pending insurance approval  Referral to OPPT for conservative management of s s  Return to MD in 5 weeks     Quality of life: good    Pain  At best pain ratin  At worst pain rating: 10  Quality: dull ache, needle-like, radiating and sharp  Progression: worsening      Diagnostic Tests  X-ray: normal  Treatments  Current treatment: physical therapy  Patient Goals  Patient goals for therapy: increased motion, decreased pain, increased strength and independence with ADLs/IADLs          Objective     Postural Observations  Seated posture: poor  Standing posture: fair  Correction of posture: makes symptoms worse    Additional Postural Observation Details  Significant bilateral rounded shoulders     Palpation   Left   Hypertonic in the upper trapezius  Tenderness of the middle trapezius and upper trapezius  Neurological Testing     Sensation     Shoulder   Left Shoulder   Intact: light touch  Paresthesia: light touch    Right Shoulder   Intact: light touch    Active Range of Motion   Cervical/Thoracic Spine       Cervical    Flexion:  Restriction level: minimal  Extension:  Restriction level: minimal  Left lateral flexion:  Restriction level: moderate  Right lateral flexion:  Restriction level moderate  Left rotation:  Restriction level: moderate  Right rotation:  Restriction level: moderate  Left Shoulder   Flexion: 160 degrees with pain  Abduction: 160 degrees with pain  External rotation 90°: 50 degrees with pain  Internal rotation 90°: 25 degrees with pain    Right Shoulder   Normal active range of motion    Left Elbow   Normal active range of motion  Elbow hyperextension    Right Elbow   Normal active range of motion  Elbow hyperextension    Strength/Myotome Testing     Left Shoulder     Planes of Motion   Flexion: 4-   Abduction: 4-   External rotation at 90°: 3+   Internal rotation at 90°: 3-     Right Shoulder   Normal muscle strength    Left Elbow   Normal strength    Right Elbow   Normal strength    Tests     Left Shoulder   Positive apprehension, laxity (step off), painful arc and sulcus sign

## 2021-05-04 ENCOUNTER — HOSPITAL ENCOUNTER (OUTPATIENT)
Dept: MRI IMAGING | Facility: HOSPITAL | Age: 31
Discharge: HOME | End: 2021-05-04
Payer: COMMERCIAL

## 2021-05-04 DIAGNOSIS — M54.12 CERVICAL RADICULOPATHY: ICD-10-CM

## 2021-05-04 DIAGNOSIS — M54.2 NECK PAIN: ICD-10-CM

## 2021-05-04 PROCEDURE — 72141 MRI NECK SPINE W/O DYE: CPT

## 2021-05-04 PROCEDURE — G1004 CDSM NDSC: HCPCS

## 2021-05-10 ENCOUNTER — PREP FOR PROCEDURE (OUTPATIENT)
Dept: PAIN MEDICINE | Facility: CLINIC | Age: 31
End: 2021-05-10

## 2021-05-10 ENCOUNTER — TELEPHONE (OUTPATIENT)
Dept: PAIN MEDICINE | Facility: CLINIC | Age: 31
End: 2021-05-10

## 2021-05-10 DIAGNOSIS — G89.4 CHRONIC PAIN SYNDROME: Primary | ICD-10-CM

## 2021-05-10 DIAGNOSIS — M54.12 CERVICAL RADICULOPATHY: Primary | ICD-10-CM

## 2021-05-10 NOTE — TELEPHONE ENCOUNTER
S/w pt, advised of the same  Pt verbalized understanding and agreeable to DANIA  Please place order, thank you

## 2021-05-10 NOTE — TELEPHONE ENCOUNTER
----- Message from Arian Agosto, Rodriguez Jessica Arias sent at 5/10/2021  8:19 AM EDT -----  Please call patient and let him know that the MRI of his cervical spine reveals a left sided disc protrusion at C6-7 potentially impacting the exiting left C7 nerve root  These findings are consistent with his complaints of neck and left upper extremity pain  We could consider performing a C7-T1 interlaminar epidural steroid injection or refer for him for surgical evaluation

## 2021-05-10 NOTE — TELEPHONE ENCOUNTER
S/w pt and scheduled him for 6/2  Went over pre procedure instructions and mailed out instruction form as well  No eating/drinking 1 hour prior - pt advised that the hospital will call him the day before to let him know what time he has to be there  Appropriate clothing   is required  Take medication as usual - pt confirmed no blood thinners/Aspirin/NSAIDS   If you get sick or abx, call  No vaccines 2 weeks before/after    Pt verbalized understanding and is scheduled for a 1 m fu after the inj with Mary

## 2021-05-13 RX ORDER — GABAPENTIN 600 MG/1
600 TABLET ORAL 3 TIMES DAILY
Qty: 90 TABLET | Refills: 1 | Status: SHIPPED | OUTPATIENT
Start: 2021-05-13

## 2021-05-13 NOTE — TELEPHONE ENCOUNTER
Pt called in to request Dr Celso Quiroga to prescribe something for the pain until his procedure  Pt is in a lot of pain  Please be advise thank you        Please call patient back @ [phone]

## 2021-05-13 NOTE — TELEPHONE ENCOUNTER
RN s/w pt  Pt states he is having increased pain in his LT arm and the only comfortable position is lying down which is not possible with caring for his 4 kids  Pt is currently taking Gabapentin 300 mg tid and has tried Tylenol without relief  Per pt he was advised not to use heat and cold makes it worse  Pt's procedure is scheduled for 6/2        Please advise-

## 2021-05-13 NOTE — TELEPHONE ENCOUNTER
Will increase gabapentin to 600 mg 3 times daily  I sent a new prescription to his pharmacy  Please advised him to increase as follows: Today- 300 mg in the morning, 300 mg this afternoon, 600 mg tonight  Tomorrow - 300 mg in the morning, 600 mg in the afternoon and evening  Saturday- 600 mg 3 times daily

## 2021-05-27 PROBLEM — M54.12 CERVICAL RADICULOPATHY: Status: ACTIVE | Noted: 2021-05-27

## 2021-05-27 PROBLEM — M54.16 LUMBAR RADICULOPATHY: Status: ACTIVE | Noted: 2021-05-27

## 2021-05-27 PROBLEM — M54.12 CERVICAL RADICULOPATHY: Status: RESOLVED | Noted: 2021-05-27 | Resolved: 2021-05-27

## 2021-06-02 ENCOUNTER — APPOINTMENT (OUTPATIENT)
Dept: RADIOLOGY | Facility: HOSPITAL | Age: 31
End: 2021-06-02
Payer: COMMERCIAL

## 2021-06-02 ENCOUNTER — HOSPITAL ENCOUNTER (OUTPATIENT)
Facility: HOSPITAL | Age: 31
Setting detail: OUTPATIENT SURGERY
Discharge: HOME | End: 2021-06-02
Attending: ANESTHESIOLOGY | Admitting: ANESTHESIOLOGY
Payer: COMMERCIAL

## 2021-06-02 VITALS
HEIGHT: 66 IN | BODY MASS INDEX: 28.93 KG/M2 | WEIGHT: 180 LBS | HEART RATE: 78 BPM | DIASTOLIC BLOOD PRESSURE: 72 MMHG | RESPIRATION RATE: 20 BRPM | SYSTOLIC BLOOD PRESSURE: 124 MMHG | TEMPERATURE: 98 F | OXYGEN SATURATION: 99 %

## 2021-06-02 DIAGNOSIS — M54.12 CERVICAL RADICULOPATHY: ICD-10-CM

## 2021-06-02 PROCEDURE — 62321 NJX INTERLAMINAR CRV/THRC: CPT | Performed by: ANESTHESIOLOGY

## 2021-06-02 RX ORDER — LIDOCAINE HYDROCHLORIDE 10 MG/ML
INJECTION, SOLUTION EPIDURAL; INFILTRATION; INTRACAUDAL; PERINEURAL AS NEEDED
Status: DISCONTINUED | OUTPATIENT
Start: 2021-06-02 | End: 2021-06-02 | Stop reason: HOSPADM

## 2021-06-02 RX ORDER — TRAMADOL HYDROCHLORIDE 50 MG/1
50 TABLET ORAL EVERY 6 HOURS PRN
COMMUNITY

## 2021-06-02 RX ORDER — DEXAMETHASONE SODIUM PHOSPHATE 10 MG/ML
INJECTION, SOLUTION INTRAMUSCULAR; INTRAVENOUS AS NEEDED
Status: DISCONTINUED | OUTPATIENT
Start: 2021-06-02 | End: 2021-06-02 | Stop reason: HOSPADM

## 2021-06-02 NOTE — H&P
Assessment:  1  Cervical radiculopathy  FL guided needle plac bx/asp/inj    FL guided needle plac bx/asp/inj       Plan:  Victor Manuel Carrizales is a 27 y o  male with complaints of neck pain and left arm pain presents to surgical center for procedure  1  We will perform a Procedure(s) (LRB):  2  BLOCK / INJECTION EPIDURAL STEROID CERVICAL left C7-T1 IESI (Left)   3  2  Follow-up 1 month after injection    Complete risks and benefits including bleeding, infection, tissue reaction, nerve injury and allergic reaction were discussed  The approach was demonstrated using models and literature was provided  Verbal and written consent was obtained  My impressions and treatment recommendations were discussed in detail with the patient who verbalized understanding and had no further questions  Discharge instructions were provided  I personally saw and examined the patient and I agree with the above discussed plan of care  Orders Placed This Encounter   Procedures    FL guided needle plac bx/asp/inj     Standing Status:   Standing     Number of Occurrences:   1     Order Specific Question:   Reason for Exam:     Answer:   Procedure: BLOCK / INJECTION EPIDURAL STEROID CERVICAL left C7-T1 IESI (Left Spine Thoracic)     New Medications Ordered This Visit   Medications    traMADol (ULTRAM) 50 mg tablet     Sig: Take 50 mg by mouth every 6 (six) hours as needed for moderate pain       History of Present Illness:  Victor Manuel Carrizales is a 27 y o  male who presents for a follow up office visit in regards to neck and left arm pain  The patients current symptoms include 8/10 constant dull/aching, cramping, shooting pain without any particular time pattern  I have personally reviewed and/or updated the patient's past medical history, past surgical history, family history, social history, current medications, allergies, and vital signs today  Review of Systems   Musculoskeletal: Positive for neck pain and neck stiffness     All other systems reviewed and are negative  Patient Active Problem List   Diagnosis    Pilonidal cyst with abscess    Mild asthma    Smoking    Gastroesophageal reflux disease    Back pain    Chronic pain syndrome    Cervical radiculopathy       Past Medical History:   Diagnosis Date    Anesthesia     Pt reports " freaking out" when he was waking up- states he was pulling out IV   Anxiety     Asthma     Body piercing     Pt reports having an eyebrow piercing that cannot be removed   Broken teeth     Bronchitis     Constipation     Cough     Pt reports coughing "a lot"  He states he is a heavy smoker but also reports asthma  Pt does not have an inhaler  States he cannot get in to see a PCP because many are not taking new patients during Covid   Depression     GERD (gastroesophageal reflux disease)     Heart murmur     History of stomach ulcers     Insomnia     Pt state he "tosses and turns" all night long d/t pain   Kidney stone     Pt reports that stones passed on their own      Lumbar back pain with radiculopathy affecting left lower extremity     Pt reports falling off a roof and " breaking my back"    Pilonidal cyst     Pt reports recurring cyst that drains at times    Pneumonia     Shortness of breath     Teeth missing        Past Surgical History:   Procedure Laterality Date    MANDIBLE SURGERY      MO REMV PILONIDAL LESION EXTENS N/A 12/16/2020    Procedure: EXCISION PILONIDAL CYST;  Surgeon: To Leung DO;  Location: [facility];  Service: General       Family History   Problem Relation Age of Onset    No Known Problems Mother     No Known Problems Father        Social History     Occupational History    Not on file   Tobacco Use    Smoking status: Current Every Day Smoker     Packs/day: 1 00     Years: 10 00     Pack years: 10 00     Types: Cigarettes    Smokeless tobacco: Former User    Tobacco comment: Pt quit smokeless tobacco ten years ago   Substance and Sexual Activity    Alcohol use: Not Currently    Drug use: Yes     Frequency: 7 0 times per week     Types: Marijuana     Comment: Pt smokes about a 1/4 pound a week  Wants to get a Medical  marijuana card    Sexual activity: Not Currently       No current facility-administered medications on file prior to encounter  Current Outpatient Medications on File Prior to Encounter   Medication Sig    albuterol (PROVENTIL HFA,VENTOLIN HFA) 90 mcg/act inhaler Inhale 2 puffs every 4 (four) hours as needed for wheezing    gabapentin (NEURONTIN) 600 MG tablet Take 1 tablet (600 mg total) by mouth 3 (three) times a day    traMADol (ULTRAM) 50 mg tablet Take 50 mg by mouth every 6 (six) hours as needed for moderate pain    [DISCONTINUED] dexamethasone (DECADRON) 2 mg tablet Take with food  Take one time dose of 10mg by mouth tomorrow    [DISCONTINUED] omeprazole (PriLOSEC) 20 mg delayed release capsule Take 20 mg by mouth daily    [DISCONTINUED] omeprazole (PriLOSEC) 20 mg delayed release capsule Take 1 capsule (20 mg total) by mouth daily for 30 doses    [DISCONTINUED] ondansetron (ZOFRAN-ODT) 4 mg disintegrating tablet Take 1 tablet (4 mg total) by mouth every 8 (eight) hours as needed for nausea or vomiting for up to 20 doses       Allergies   Allergen Reactions    Penicillins GI Intolerance     Pt reports nausea and vomiting    Adhesive [Medical Tape] Itching     Pt reports a skin irritation- red and itchy   Amitriptyline Other (See Comments)     Restless legs    Nsaids      Pt has a h/o stomach ulcers  Physical Exam:    /71   Pulse 77   Temp 98 °F (36 7 °C)   Resp 20   Ht 5' 6" (1 676 m)   Wt 81 6 kg (180 lb)   SpO2 99%   BMI 29 05 kg/m²     Constitutional:normal, well developed, well nourished, alert, in no distress and non-toxic and no overt pain behavior    Eyes:anicteric  HEENT:grossly intact  Neck:supple, symmetric, trachea midline and no masses   Pulmonary:even and unlabored  Cardiovascular:No edema or pitting edema present  Skin:Normal without rashes or lesions and well hydrated  Psychiatric:Mood and affect appropriate  Neurologic:Cranial Nerves II-XII grossly intact  Musculoskeletal:normal

## 2021-06-02 NOTE — DISCHARGE INSTRUCTIONS
Epidural Steroid Injection   WHAT YOU NEED TO KNOW:   An epidural steroid injection (DANIA) is a procedure to inject steroid medicine into the epidural space  The epidural space is between your spinal cord and vertebrae  Steroids reduce inflammation and fluid buildup in your spine that may be causing pain  You may be given pain medicine along with the steroids  ACTIVITY  · Do not drive or operate machinery today  · No strenuous activity today - bending, lifting, etc   · You may resume normal activites starting tomorrow - start slowly and as tolerated  · You may shower today, but no tub baths or hot tubs  · You may have numbness for several hours from the local anesthetic  Please use caution and common sense, especially with weight-bearing activities  CARE OF THE INJECTION SITE  · If you have soreness or pain, apply ice to the area today (20 minutes on/20 minutes off)  · Starting tomorrow, you may use warm, moist heat or ice if needed  · You may have an increase or change in your discomfort for 36-48 hours after your treatment  · Apply ice and continue with any pain medication you have been prescribed  · Notify [facility] if you have any of the following: redness, drainage, swelling, headache, stiff neck or fever above 100°F     SPECIAL INSTRUCTIONS  · Our office will contact you in approximately 7 days for a progress report  MEDICATIONS  · Continue to take all routine medications  · Our office may have instructed you to hold some medications  As no general anesthesia was used in today's procedure, you should not experience any side effects related to anesthesia  If you have a problem specifically related to your procedure, please call our office at [phone]  Problems not related to your procedure should be directed to your primary care physician

## 2021-06-02 NOTE — OP NOTE
OPERATIVE REPORT  PATIENT NAME: Leonor Wolf    :  1990  MRN: [id number]  Pt Location: [facility] OR ROOM 01    SURGERY DATE: 2021    Surgeon(s) and Role:     * Precious Rodney MD - Primary    Preop Diagnosis:  Cervical radiculopathy [M54 12]    Post-Op Diagnosis Codes:     * Cervical radiculopathy [M54 12]    Procedure(s) (LRB):  BLOCK / INJECTION EPIDURAL STEROID CERVICAL left C7-T1 IESI (Left)    Specimen(s):  * No specimens in log *    Estimated Blood Loss:   Minimal    Drains:  Open Drain Buttock (Active)   Number of days: 168       Anesthesia Type:   Local    Operative Indications:  Cervical radiculopathy [M54 12]      Operative Findings:  same    Complications:   None    Procedure and Technique:  Fluoroscopically-guided Left C7-T1 interlaminar epidural steroid injection under fluoroscopy      After discussing the risks, benefits, and alternatives to the procedure, the patient expressed understanding and wished to proceed  The patient was brought to the fluoroscopy suite and placed in the prone position  A procedural pause was conducted to verify:  correct patient identity, procedure to be performed and as applicable, correct side and site, correct patient position, and availability of implants, special equipment and special requirements  After identifying the C7-T1 space fluoroscopically, the skin was sterilely prepped and draped in the usual fashion using Chloraprep skin prep  The skin and subcutaneous tissue were anesthetized with 0 5 % lidocaine  Utilizing a loss of resistance technique and intermittent fluoroscopic guidance, a 3 5 20 gauge Tuohy needle was advanced into the epidural space  Proper needle positioning was confirmed using multiple fluoroscopic views  After negative aspiration, Omnipaque 300 contrast was injected confirming epidural spread without evidence of intravascular or intrathecal spread    A 4 ml solution consisting of 10 mg of dexamethasone in sterile saline was injected slowly and incrementally into the epidural space  Following the injection the needle was withdrawn slightly and flushed with 0 5 % lidocaine as it was fully extracted  The patient tolerated the procedure well and there were no apparent complications  After appropriate observation, the patient was dismissed from the clinic in good condition under their own power       I was present for the entire procedure    Patient Disposition:  hemodynamically stable    SIGNATURE: Suni Wisdom MD  DATE: June 2, 2021  TIME: 12:16 PM

## 2021-06-09 ENCOUNTER — TELEPHONE (OUTPATIENT)
Dept: PAIN MEDICINE | Facility: CLINIC | Age: 31
End: 2021-06-09

## 2021-12-06 ENCOUNTER — HOSPITAL ENCOUNTER (OUTPATIENT)
Dept: NON INVASIVE DIAGNOSTICS | Facility: HOSPITAL | Age: 31
Discharge: HOME | End: 2021-12-06
Payer: COMMERCIAL

## 2021-12-06 ENCOUNTER — HOSPITAL ENCOUNTER (EMERGENCY)
Facility: HOSPITAL | Age: 31
Discharge: HOME | End: 2021-12-06
Attending: EMERGENCY MEDICINE | Admitting: EMERGENCY MEDICINE
Payer: COMMERCIAL

## 2021-12-06 VITALS
HEIGHT: 66 IN | DIASTOLIC BLOOD PRESSURE: 74 MMHG | WEIGHT: 170 LBS | RESPIRATION RATE: 18 BRPM | OXYGEN SATURATION: 98 % | HEART RATE: 86 BPM | BODY MASS INDEX: 27.32 KG/M2 | SYSTOLIC BLOOD PRESSURE: 149 MMHG | TEMPERATURE: 99.1 F

## 2021-12-06 DIAGNOSIS — M79.604 RIGHT LEG PAIN: ICD-10-CM

## 2021-12-06 DIAGNOSIS — M79.604 RIGHT LEG PAIN: Primary | ICD-10-CM

## 2021-12-06 PROCEDURE — 93971 EXTREMITY STUDY: CPT | Performed by: SURGERY

## 2021-12-06 PROCEDURE — 93971 EXTREMITY STUDY: CPT

## 2021-12-06 PROCEDURE — 99284 EMERGENCY DEPT VISIT MOD MDM: CPT | Performed by: EMERGENCY MEDICINE

## 2021-12-06 PROCEDURE — 96372 THER/PROPH/DIAG INJ SC/IM: CPT

## 2021-12-06 PROCEDURE — 99283 EMERGENCY DEPT VISIT LOW MDM: CPT

## 2021-12-06 RX ADMIN — ENOXAPARIN SODIUM 80 MG: 80 INJECTION SUBCUTANEOUS at 02:29

## (undated) DEVICE — ELECTRODE NEEDLE E-Z CLEAN 2.75IN 7CM -0013

## (undated) DEVICE — ABDOMINAL PAD: Brand: DERMACEA

## (undated) DEVICE — SUT CHROMIC 2-0 SH 27 IN G123H

## (undated) DEVICE — GLOVE SRG BIOGEL 8

## (undated) DEVICE — SUT VICRYL 3-0 CT-1 36 IN J944H

## (undated) DEVICE — SPONGE LAP 18 X 4 IN

## (undated) DEVICE — GAUZE SPONGES,16 PLY: Brand: CURITY

## (undated) DEVICE — FLEXIBLE ADHESIVE BANDAGE,X-LARGE: Brand: CURITY

## (undated) DEVICE — TIBURON PEDIATRIC DRAPE: Brand: CONVERTORS

## (undated) DEVICE — SUT ETHILON 2-0 FSLX 30 IN 1674H

## (undated) DEVICE — CHLORAPREP HI-LITE 10.5ML ORANGE

## (undated) DEVICE — PLUMEPEN PRO 10FT

## (undated) DEVICE — PAD GROUNDING ADULT

## (undated) DEVICE — SYRINGE 10ML LL

## (undated) DEVICE — ASTOUND STANDARD SURGICAL GOWN, XL: Brand: CONVERTORS

## (undated) DEVICE — DISPOSABLE BRIEF/UNDERWEAR

## (undated) DEVICE — PLASTIC ADHESIVE BANDAGE: Brand: CURITY

## (undated) DEVICE — GLOVE SRG BIOGEL 7

## (undated) DEVICE — CURITY PLAIN PACKING STRIP: Brand: CURITY

## (undated) DEVICE — NEEDLE 25G X 1 1/2

## (undated) DEVICE — 3000CC GUARDIAN II: Brand: GUARDIAN

## (undated) DEVICE — 3M™ DURAPORE™ SURGICAL TAPE 1538-3, 3 INCH X 10 YARD (7,5CM X 9,1M), 4 ROLLS/BOX: Brand: 3M™ DURAPORE™

## (undated) DEVICE — TRAY EPIDURAL SINGLE SHOT

## (undated) DEVICE — TUBING SUCTION 5MM X 12 FT

## (undated) DEVICE — MEDI-VAC YANKAUER SUCTION HANDLE W/BULBOUS AND CONTROL VENT: Brand: CARDINAL HEALTH

## (undated) DEVICE — SUT CHROMIC 2-0 CT-2 27 IN 883H

## (undated) DEVICE — SPONGE LAP 18 X 18 IN

## (undated) DEVICE — CHEST ROLL FOAM POSITIONER: Brand: CARDINAL HEALTH

## (undated) DEVICE — BETHLEHEM UNIVERSAL MINOR GEN: Brand: CARDINAL HEALTH

## (undated) DEVICE — SPONGE STICK WITH PVP-I: Brand: KENDALL

## (undated) DEVICE — SYRINGE 5ML LL